# Patient Record
Sex: MALE | Race: BLACK OR AFRICAN AMERICAN | NOT HISPANIC OR LATINO | Employment: OTHER | URBAN - METROPOLITAN AREA
[De-identification: names, ages, dates, MRNs, and addresses within clinical notes are randomized per-mention and may not be internally consistent; named-entity substitution may affect disease eponyms.]

---

## 2017-01-18 DIAGNOSIS — M17.0 OSTEOARTHRITIS OF BOTH KNEES, UNSPECIFIED OSTEOARTHRITIS TYPE: Chronic | ICD-10-CM

## 2017-01-18 NOTE — TELEPHONE ENCOUNTER
----- Message from Sarah Asencio sent at 1/18/2017  2:25 PM CST -----  Contact: self  Patient needs a refill on Hydrocodone called into [facility] pharmacy at [phone].  Please call patient at [phone] if you have any questions. Thanks!     [facility] [address] [place] [address] & [address] [address]  Phone: [phone] Fax: [phone]

## 2017-01-19 RX ORDER — HYDROCODONE BITARTRATE AND ACETAMINOPHEN 7.5; 325 MG/1; MG/1
1 TABLET ORAL EVERY 6 HOURS PRN
Qty: 90 TABLET | Refills: 0 | Status: SHIPPED | OUTPATIENT
Start: 2017-01-19 | End: 2017-02-14 | Stop reason: SDUPTHER

## 2017-02-14 DIAGNOSIS — M17.0 OSTEOARTHRITIS OF BOTH KNEES, UNSPECIFIED OSTEOARTHRITIS TYPE: Chronic | ICD-10-CM

## 2017-02-17 DIAGNOSIS — M17.0 OSTEOARTHRITIS OF BOTH KNEES, UNSPECIFIED OSTEOARTHRITIS TYPE: Chronic | ICD-10-CM

## 2017-02-17 NOTE — TELEPHONE ENCOUNTER
----- Message from Gely Tavarez sent at 2/17/2017  4:39 PM CST -----  Refill on Rx Hydrocodone.  Please send into [facility]/[address]  Any questions call [phone].

## 2017-02-17 NOTE — TELEPHONE ENCOUNTER
Patient requesting refill of Hydrocodone.  LR--1-19-17  LOV-11-11-16  FOV-None Noted  Urine Bjucyuwjnx-7-18-16  Pain Zlavpjsd-2-8-14

## 2017-02-19 RX ORDER — HYDROCODONE BITARTRATE AND ACETAMINOPHEN 7.5; 325 MG/1; MG/1
1 TABLET ORAL EVERY 6 HOURS PRN
Qty: 90 TABLET | Refills: 0 | OUTPATIENT
Start: 2017-02-19

## 2017-02-19 RX ORDER — HYDROCODONE BITARTRATE AND ACETAMINOPHEN 7.5; 325 MG/1; MG/1
1 TABLET ORAL EVERY 6 HOURS PRN
Qty: 90 TABLET | Refills: 0 | Status: SHIPPED | OUTPATIENT
Start: 2017-02-19 | End: 2017-03-16 | Stop reason: SDUPTHER

## 2017-03-16 DIAGNOSIS — M17.0 OSTEOARTHRITIS OF BOTH KNEES, UNSPECIFIED OSTEOARTHRITIS TYPE: Chronic | ICD-10-CM

## 2017-03-16 NOTE — TELEPHONE ENCOUNTER
----- Message from Nathalie Dillard sent at 3/16/2017  3:06 PM CDT -----  Contact: pt   hydrocodone-acetaminophen 7.5-325mg (NORCO) 7.5-325 mg   .  [facility] [address] [place] [address] & [address] [address]  Phone: [phone] Fax: [phone]    Call back [phone]

## 2017-03-17 RX ORDER — HYDROCODONE BITARTRATE AND ACETAMINOPHEN 7.5; 325 MG/1; MG/1
1 TABLET ORAL EVERY 6 HOURS PRN
Qty: 90 TABLET | Refills: 0 | Status: SHIPPED | OUTPATIENT
Start: 2017-03-17 | End: 2017-04-21 | Stop reason: SDUPTHER

## 2017-04-17 DIAGNOSIS — M17.0 OSTEOARTHRITIS OF BOTH KNEES, UNSPECIFIED OSTEOARTHRITIS TYPE: Chronic | ICD-10-CM

## 2017-04-17 RX ORDER — HYDROCODONE BITARTRATE AND ACETAMINOPHEN 7.5; 325 MG/1; MG/1
1 TABLET ORAL EVERY 6 HOURS PRN
Qty: 90 TABLET | Refills: 0 | Status: CANCELLED | OUTPATIENT
Start: 2017-04-17

## 2017-04-17 NOTE — TELEPHONE ENCOUNTER
----- Message from Natalia Sheikh sent at 4/17/2017  7:59 AM CDT -----  Patient requested refill on Hydrocodone, call into [facility] pharmacy below.  Please call patient at  [phone] if you have any questions. Thank you.       [facility] [address] [place] [address] & [address] [address]  Phone: [phone] Fax: [phone]

## 2017-04-17 NOTE — TELEPHONE ENCOUNTER
Patient requesting a refill of Hydrocodone.  LR--3-17-17  LOV--11-11-16  FOV--None Noted  Urine Toxicology--8-22-16  Pain Contract--4-2-14

## 2017-04-19 DIAGNOSIS — M17.0 OSTEOARTHRITIS OF BOTH KNEES, UNSPECIFIED OSTEOARTHRITIS TYPE: Chronic | ICD-10-CM

## 2017-04-19 RX ORDER — HYDROCODONE BITARTRATE AND ACETAMINOPHEN 7.5; 325 MG/1; MG/1
1 TABLET ORAL EVERY 6 HOURS PRN
Qty: 90 TABLET | Refills: 0 | Status: CANCELLED | OUTPATIENT
Start: 2017-04-19

## 2017-04-19 NOTE — TELEPHONE ENCOUNTER
----- Message from Candace Cote sent at 4/19/2017  7:52 AM CDT -----  Contact: [phone]  Patient requesting a refill on hydrocodone.      Patient will be using   [facility] [address] [place] [address] & [address] [address]  Phone: [phone] Fax: [phone]    Please call patient at [phone]. Thanks!

## 2017-04-19 NOTE — TELEPHONE ENCOUNTER
Patient requesting a refill of Hydrocodone.  LR--3-17-17  LOV--11-11-17  FOV--NONE NOTED  Urine Toxicology--8-22-16  Pain Contract--5-12-14

## 2017-04-21 ENCOUNTER — TELEPHONE (OUTPATIENT)
Dept: FAMILY MEDICINE | Facility: CLINIC | Age: 57
End: 2017-04-21

## 2017-04-21 DIAGNOSIS — M17.0 OSTEOARTHRITIS OF BOTH KNEES, UNSPECIFIED OSTEOARTHRITIS TYPE: Chronic | ICD-10-CM

## 2017-04-21 RX ORDER — HYDROCODONE BITARTRATE AND ACETAMINOPHEN 7.5; 325 MG/1; MG/1
1 TABLET ORAL EVERY 6 HOURS PRN
Qty: 90 TABLET | Refills: 0 | Status: SHIPPED | OUTPATIENT
Start: 2017-04-21 | End: 2017-04-21 | Stop reason: SDUPTHER

## 2017-04-21 RX ORDER — HYDROCODONE BITARTRATE AND ACETAMINOPHEN 7.5; 325 MG/1; MG/1
1 TABLET ORAL EVERY 6 HOURS PRN
Qty: 90 TABLET | Refills: 0 | Status: SHIPPED | OUTPATIENT
Start: 2017-04-21 | End: 2017-05-19 | Stop reason: SDUPTHER

## 2017-04-21 NOTE — TELEPHONE ENCOUNTER
Patient notified prescription for Hydrocodone printed and available in office for . Verbalized understanding.

## 2017-04-21 NOTE — TELEPHONE ENCOUNTER
----- Message from Alison Dennison sent at 4/21/2017 12:03 PM CDT -----  Contact: patient  Patient calling in regards to following up on the refill request for Hydrocodone. He would like a call back when it is sent in. Please advise.  Call back .  Thanks!  [facility] [employer] [address] [place] [address] & [address] [address]  Phone: [phone] Fax: [phone]

## 2017-04-24 ENCOUNTER — TELEPHONE (OUTPATIENT)
Dept: FAMILY MEDICINE | Facility: CLINIC | Age: 57
End: 2017-04-24

## 2017-04-24 NOTE — TELEPHONE ENCOUNTER
----- Message from Gwen Carroll sent at 4/22/2017 11:53 AM CDT -----  Contact: Patient came to lab desk this Saturday  Patient stated he was contacted last night at 6PM to come  his prescription. I went to the prescription box to check and was unable to locate one for him. I checked both the letter before and after the K's to ensure it was not misfiled.    Patient states he will call Monday 4/24/17

## 2017-05-19 DIAGNOSIS — M17.0 OSTEOARTHRITIS OF BOTH KNEES, UNSPECIFIED OSTEOARTHRITIS TYPE: Chronic | ICD-10-CM

## 2017-05-19 NOTE — TELEPHONE ENCOUNTER
----- Message from Nathalie Dillard sent at 5/19/2017  3:19 PM CDT -----  Contact: pt   hydrocodone-acetaminophen 7.5-325mg (NORCO) 7.5-325 mg   Wants refilled today   Call back  [phone]

## 2017-05-19 NOTE — TELEPHONE ENCOUNTER
Patient requesting a refill of Hydrocodone. Last office visit noted on 11-11-16. Spoke to patient, advised him office visit required every 3-4 months related to narcotic refill. Appointment scheduled for 6-1-17. Patient agreed to appointment date and time.  LR--4-21-17  LOV--11-11-16  FOV--6-1-17  Urine Toxicology--8-22-16  Pain Contract--4-2-14

## 2017-05-20 RX ORDER — HYDROCODONE BITARTRATE AND ACETAMINOPHEN 7.5; 325 MG/1; MG/1
1 TABLET ORAL EVERY 6 HOURS PRN
Qty: 45 TABLET | Refills: 0 | Status: SHIPPED | OUTPATIENT
Start: 2017-05-20 | End: 2017-06-01 | Stop reason: SDUPTHER

## 2017-06-01 ENCOUNTER — OFFICE VISIT (OUTPATIENT)
Dept: FAMILY MEDICINE | Facility: CLINIC | Age: 57
End: 2017-06-01
Payer: COMMERCIAL

## 2017-06-01 VITALS
BODY MASS INDEX: 29.97 KG/M2 | HEIGHT: 71 IN | HEART RATE: 74 BPM | SYSTOLIC BLOOD PRESSURE: 133 MMHG | DIASTOLIC BLOOD PRESSURE: 86 MMHG | WEIGHT: 214.06 LBS | TEMPERATURE: 98 F

## 2017-06-01 DIAGNOSIS — S83.512D RUPTURE OF ANTERIOR CRUCIATE LIGAMENT OF BOTH KNEES, SUBSEQUENT ENCOUNTER: Primary | ICD-10-CM

## 2017-06-01 DIAGNOSIS — S83.511D RUPTURE OF ANTERIOR CRUCIATE LIGAMENT OF BOTH KNEES, SUBSEQUENT ENCOUNTER: Primary | ICD-10-CM

## 2017-06-01 DIAGNOSIS — M17.0 OSTEOARTHRITIS OF BOTH KNEES, UNSPECIFIED OSTEOARTHRITIS TYPE: Chronic | ICD-10-CM

## 2017-06-01 DIAGNOSIS — J30.1 SEASONAL ALLERGIC RHINITIS DUE TO POLLEN: ICD-10-CM

## 2017-06-01 DIAGNOSIS — I10 ESSENTIAL HYPERTENSION: Chronic | ICD-10-CM

## 2017-06-01 DIAGNOSIS — M17.9 OSTEOARTHRITIS OF KNEE, UNSPECIFIED LATERALITY, UNSPECIFIED OSTEOARTHRITIS TYPE: ICD-10-CM

## 2017-06-01 LAB
AMPHET+METHAMPHET UR QL: NEGATIVE
BARBITURATES UR QL SCN>200 NG/ML: NEGATIVE
BENZODIAZ UR QL SCN>200 NG/ML: NEGATIVE
BZE UR QL SCN: NEGATIVE
CANNABINOIDS UR QL SCN: NEGATIVE
CREAT UR-MCNC: 217 MG/DL
ETHANOL UR-MCNC: <10 MG/DL
METHADONE UR QL SCN>300 NG/ML: NEGATIVE
OPIATES UR QL SCN: NORMAL
PCP UR QL SCN>25 NG/ML: NEGATIVE
TOXICOLOGY INFORMATION: NORMAL

## 2017-06-01 PROCEDURE — 80307 DRUG TEST PRSMV CHEM ANLYZR: CPT

## 2017-06-01 PROCEDURE — 99214 OFFICE O/P EST MOD 30 MIN: CPT | Mod: S$GLB,,, | Performed by: NURSE PRACTITIONER

## 2017-06-01 PROCEDURE — 99999 PR PBB SHADOW E&M-EST. PATIENT-LVL III: CPT | Mod: PBBFAC,,, | Performed by: NURSE PRACTITIONER

## 2017-06-01 RX ORDER — FLUTICASONE PROPIONATE 50 MCG
SPRAY, SUSPENSION (ML) NASAL
Qty: 1 BOTTLE | Refills: 11 | Status: SHIPPED | OUTPATIENT
Start: 2017-06-01 | End: 2017-12-01 | Stop reason: SDUPTHER

## 2017-06-01 RX ORDER — HYDROCODONE BITARTRATE AND ACETAMINOPHEN 7.5; 325 MG/1; MG/1
1 TABLET ORAL EVERY 6 HOURS PRN
Qty: 90 TABLET | Refills: 0 | Status: SHIPPED | OUTPATIENT
Start: 2017-06-01 | End: 2017-06-28 | Stop reason: SDUPTHER

## 2017-06-01 NOTE — PATIENT INSTRUCTIONS

## 2017-06-01 NOTE — PROGRESS NOTES
Subjective:       Patient ID: Shamar Anne is a 57 y.o. male.    Chief Complaint: Hypertension    Mr. Anne presents to the clinic today for medication refill of [place] which he takes for bilateral knee pain.  He was referred at last visit to Dr. Davis, Pain Management, and he did not make an appointment.  He states he has a friend who received injections to her back and they do not help her.  He does not want knee injections.  He saw Dr. Payne in 2013 who advised knee replacement for bilateral chronic ACL tears.  He does not currently have anyone to care for him after surgery but he plans on eventually having the surgery.  He has a history of hypertension which is controlled today.  He complains of chronic rhinitis.  States this is seasonal although he had allergy testing in the past which was negative.  History of sinus surgery.  Takes Zyrtec daily, no nasal sprays.  Used neti pot in the past with good relief.  Lab work was ordered at last visit but he has not yet had this done.       Hypertension   This is a chronic problem. The current episode started more than 1 month ago. The problem is controlled. Pertinent negatives include no chest pain or shortness of breath. There are no associated agents to hypertension. Risk factors for coronary artery disease include male gender and dyslipidemia. Past treatments include calcium channel blockers. The current treatment provides significant improvement. There is no history of angina, kidney disease, CAD/MI or CVA.     Review of Systems   Constitutional: Negative for chills and fever.   HENT: Positive for congestion. Negative for ear pain and sinus pressure.    Respiratory: Negative for cough, shortness of breath and wheezing.    Cardiovascular: Negative for chest pain.   Musculoskeletal: Positive for arthralgias (bilateral knee). Negative for myalgias.       Objective:      Physical Exam   Constitutional: He is oriented to person, place, and time. He appears  well-developed and well-nourished. No distress.   HENT:   Head: Normocephalic and atraumatic.   Right Ear: External ear normal.   Left Ear: External ear normal.   Mouth/Throat: Oropharynx is clear and moist. No oropharyngeal exudate.   Eyes: Pupils are equal, round, and reactive to light. Right eye exhibits no discharge. Left eye exhibits no discharge.   Neck: Neck supple. No thyromegaly present.   Cardiovascular: Normal rate and regular rhythm.  Exam reveals no gallop and no friction rub.    No murmur heard.  Pulmonary/Chest: Effort normal and breath sounds normal. No respiratory distress. He has no wheezes. He has no rales.   Musculoskeletal: Normal range of motion. He exhibits no edema.        Right knee: He exhibits normal range of motion, no effusion and no erythema. Tenderness found. Lateral joint line tenderness noted.        Left knee: He exhibits normal range of motion, no swelling, no erythema and no bony tenderness. Tenderness found. Lateral joint line tenderness noted. No patellar tendon tenderness noted.   Lymphadenopathy:     He has no cervical adenopathy.   Neurological: He is alert and oriented to person, place, and time. Coordination normal.   Skin: Skin is warm and dry.   Psychiatric: He has a normal mood and affect. His behavior is normal. Thought content normal.   Vitals reviewed.          Current Outpatient Prescriptions:     amlodipine (NORVASC) 10 MG tablet, Take 1 tablet (10 mg total) by mouth once daily., Disp: 90 tablet, Rfl: 3    cetirizine (ZYRTEC) 10 MG tablet, , Disp: , Rfl:     diclofenac (VOLTAREN) 75 MG EC tablet, TAKE ONE TABLET BY MOUTH TWO TIMES A DAY AS NEEDED, Disp: 45 tablet, Rfl: 1    fenofibrate 160 MG Tab, Take 1 tablet (160 mg total) by mouth once daily., Disp: 90 tablet, Rfl: 3    fluticasone (FLONASE) 50 mcg/actuation nasal spray, Use two sprays in each nostril once daily for one week, then one spray in each nostril daily., Disp: 1 Bottle, Rfl: 11     hydrocodone-acetaminophen 7.5-325mg (NORCO) 7.5-325 mg per tablet, Take 1 tablet by mouth every 6 (six) hours as needed., Disp: 45 tablet, Rfl: 0    pantoprazole (PROTONIX) 40 MG tablet, Take 1 tablet (40 mg total) by mouth once daily., Disp: 90 tablet, Rfl: 1    VIAGRA 100 mg tablet, , Disp: , Rfl:   Assessment:       1. Rupture of anterior cruciate ligament of both knees, subsequent encounter    2. Seasonal allergic rhinitis due to pollen    3. Osteoarthritis of knee, unspecified laterality, unspecified osteoarthritis type    4. Essential hypertension        Plan:     Rupture of anterior cruciate ligament of both knees, subsequent encounter  Patient plans to schedule knee surgery in the future.  Sent refill request for [place] to Dr. Magana.    RTC 3 mos.    Seasonal allergic rhinitis due to pollen  Saline nasal spray.  -     fluticasone (FLONASE) 50 mcg/actuation nasal spray; Use two sprays in each nostril once daily for one week, then one spray in each nostril daily.  Dispense: 1 Bottle; Refill: 11    Osteoarthritis of knee, unspecified laterality, unspecified osteoarthritis type  -     TOXICOLOGY SCREEN, URINE, RANDOM (COMPLIANCE)    Essential hypertension  Stable on current medication.  Patient readiness: acceptance and barriers:none    During the course of the visit the patient was educated and counseled about the following:     Hypertension:   Medication: no change.    Goals: Hypertension: Reduce Blood Pressure    Did patient meet goals/outcomes: Yes    The following self management tools provided: declined    Patient Instructions (the written plan) was given to the patient/family.     Time spent with patient: 15 minutes

## 2017-06-01 NOTE — TELEPHONE ENCOUNTER
Patient seen today for medication refill.  Urine tox done today.  Last prescribed 5/20 but only for 45 tabs q6h prn.  He does not want to see Dr. Davis.

## 2017-06-28 DIAGNOSIS — M17.0 OSTEOARTHRITIS OF BOTH KNEES, UNSPECIFIED OSTEOARTHRITIS TYPE: Chronic | ICD-10-CM

## 2017-07-03 DIAGNOSIS — M17.0 OSTEOARTHRITIS OF BOTH KNEES, UNSPECIFIED OSTEOARTHRITIS TYPE: Chronic | ICD-10-CM

## 2017-07-03 RX ORDER — DICLOFENAC SODIUM 75 MG/1
TABLET, DELAYED RELEASE ORAL
Qty: 45 TABLET | Refills: 2 | Status: SHIPPED | OUTPATIENT
Start: 2017-07-03 | End: 2018-02-26

## 2017-07-04 RX ORDER — HYDROCODONE BITARTRATE AND ACETAMINOPHEN 7.5; 325 MG/1; MG/1
1 TABLET ORAL EVERY 6 HOURS PRN
Qty: 90 TABLET | Refills: 0 | Status: SHIPPED | OUTPATIENT
Start: 2017-07-04 | End: 2017-07-28 | Stop reason: SDUPTHER

## 2017-07-28 DIAGNOSIS — M17.0 OSTEOARTHRITIS OF BOTH KNEES, UNSPECIFIED OSTEOARTHRITIS TYPE: Chronic | ICD-10-CM

## 2017-07-28 NOTE — TELEPHONE ENCOUNTER
----- Message from Candace Cote sent at 7/28/2017  9:07 AM CDT -----  Contact: [phone]  Patient requesting a refill on hydrocodone.      Patient will be using   [facility] [address] [place] [address] & [address] [address]  Phone: [phone] Fax: [phone]    Please call patient at [phone]. Thanks!

## 2017-07-28 NOTE — TELEPHONE ENCOUNTER
Patient requesting a refill of Hydrocodone.  LR--7-4-17  LOV--6-1-17  FOV--9-5-17  Urine Toxicology--6-1-17  Pain Contract--5-12-14

## 2017-07-29 RX ORDER — HYDROCODONE BITARTRATE AND ACETAMINOPHEN 7.5; 325 MG/1; MG/1
1 TABLET ORAL EVERY 6 HOURS PRN
Qty: 90 TABLET | Refills: 0 | Status: SHIPPED | OUTPATIENT
Start: 2017-07-29 | End: 2017-09-05 | Stop reason: SDUPTHER

## 2017-07-31 ENCOUNTER — TELEPHONE (OUTPATIENT)
Dept: FAMILY MEDICINE | Facility: CLINIC | Age: 57
End: 2017-07-31

## 2017-07-31 NOTE — TELEPHONE ENCOUNTER
Prescription for Hydrocodone printed by Dr. Magana due to e-scribe error.  Prescription available for  at office. Patient notified, states will  prescription today. Prescription placed at  for patient to  at his convenience.

## 2017-07-31 NOTE — TELEPHONE ENCOUNTER
----- Message from Jeffery Bone sent at 7/31/2017  9:09 AM CDT -----  Contact: Patient  Patient states that he needs a refill for the hydrocodone-acetaminophen 7.5-325mg (NORCO) 7.5-325 mg per tablets and for the fluticasone (FLONASE) 50 mcg/actuation nasal spray.  It looks like the pharmacy did not get the request and to please re-send. Can you please look in to this matter and call the patient back at [phone].  Thank you      [facility] [address] [place] [address] & [address] [address]  Phone: [phone] Fax: [phone]

## 2017-08-24 DIAGNOSIS — Z12.11 COLON CANCER SCREENING: ICD-10-CM

## 2017-09-01 ENCOUNTER — DOCUMENTATION ONLY (OUTPATIENT)
Dept: FAMILY MEDICINE | Facility: CLINIC | Age: 57
End: 2017-09-01

## 2017-09-01 NOTE — PROGRESS NOTES
Pre-Visit Chart Review  For Appointment Scheduled on 09/05/2017    Health Maintenance Due   Topic Date Due    TETANUS VACCINE  02/05/1979    Colonoscopy  02/05/2011    Influenza Vaccine  08/01/2017

## 2017-09-05 ENCOUNTER — OFFICE VISIT (OUTPATIENT)
Dept: FAMILY MEDICINE | Facility: CLINIC | Age: 57
End: 2017-09-05
Payer: COMMERCIAL

## 2017-09-05 VITALS
BODY MASS INDEX: 29.94 KG/M2 | HEART RATE: 59 BPM | HEIGHT: 71 IN | TEMPERATURE: 98 F | WEIGHT: 213.88 LBS | SYSTOLIC BLOOD PRESSURE: 128 MMHG | DIASTOLIC BLOOD PRESSURE: 83 MMHG

## 2017-09-05 DIAGNOSIS — E78.2 MIXED HYPERLIPIDEMIA: Primary | ICD-10-CM

## 2017-09-05 DIAGNOSIS — M17.0 OSTEOARTHRITIS OF BOTH KNEES, UNSPECIFIED OSTEOARTHRITIS TYPE: Chronic | ICD-10-CM

## 2017-09-05 DIAGNOSIS — Z12.5 PROSTATE CANCER SCREENING: ICD-10-CM

## 2017-09-05 PROCEDURE — 3079F DIAST BP 80-89 MM HG: CPT | Mod: S$GLB,,, | Performed by: FAMILY MEDICINE

## 2017-09-05 PROCEDURE — 3008F BODY MASS INDEX DOCD: CPT | Mod: S$GLB,,, | Performed by: FAMILY MEDICINE

## 2017-09-05 PROCEDURE — 99999 PR PBB SHADOW E&M-EST. PATIENT-LVL IV: CPT | Mod: PBBFAC,,, | Performed by: FAMILY MEDICINE

## 2017-09-05 PROCEDURE — 3074F SYST BP LT 130 MM HG: CPT | Mod: S$GLB,,, | Performed by: FAMILY MEDICINE

## 2017-09-05 PROCEDURE — 99214 OFFICE O/P EST MOD 30 MIN: CPT | Mod: S$GLB,,, | Performed by: FAMILY MEDICINE

## 2017-09-05 RX ORDER — HYDROCODONE BITARTRATE AND ACETAMINOPHEN 7.5; 325 MG/1; MG/1
1 TABLET ORAL EVERY 8 HOURS PRN
Qty: 90 TABLET | Refills: 0 | Status: SHIPPED | OUTPATIENT
Start: 2017-11-05 | End: 2017-12-01 | Stop reason: SDUPTHER

## 2017-09-05 RX ORDER — HYDROCODONE BITARTRATE AND ACETAMINOPHEN 7.5; 325 MG/1; MG/1
1 TABLET ORAL EVERY 8 HOURS PRN
Qty: 90 TABLET | Refills: 0 | Status: SHIPPED | OUTPATIENT
Start: 2017-09-05 | End: 2017-12-01 | Stop reason: SDUPTHER

## 2017-09-05 RX ORDER — HYDROCODONE BITARTRATE AND ACETAMINOPHEN 7.5; 325 MG/1; MG/1
1 TABLET ORAL EVERY 8 HOURS PRN
Qty: 90 TABLET | Refills: 0 | Status: SHIPPED | OUTPATIENT
Start: 2017-10-05 | End: 2017-12-01 | Stop reason: SDUPTHER

## 2017-09-05 RX ORDER — BROMPHENIRAMINE MALEATE, DEXTROMETHORPHAN HBR, PHENYLEPHRINE HCL, DIPHENHYDRAMINE HCL, PHENYLEPHRINE HCL 0.52G
4 KIT ORAL DAILY
Qty: 120 CAPSULE | Refills: 11 | Status: SHIPPED | OUTPATIENT
Start: 2017-09-05 | End: 2022-02-14

## 2017-09-05 NOTE — PATIENT INSTRUCTIONS
Arthritis: Exercise     Look for exercise classes for arthritis in your community.     Exercise is important to your overall health. It is especially important in people with arthritis. Regular exercise can:  · Keep your heart and blood vessels healthy  · Help with weight management, or weight loss  · Improve your mood  · Help prevent and manage health problems such as:  ¨ Diabetes  ¨ High blood pressure  ¨ High cholesterol  ¨ Depression  In people with arthritis, it offers all of those benefits and it can:  · Lessen pain and stiffness  · Strengthen muscles that support your joints  · Help you to be able to do the things you enjoy  Exercise and arthritis  Exercise is an important part of any arthritis treatment plan. A complete program consists of the following three types of exercises:  · Aerobic exercises for cardiovascular health and overall fitness.   · Strengthening exercises to build up muscles to help prevent injury and keep joints stable.  · Range-of-motion exercises to keep muscles and joints flexible.  Getting started  Talk with your healthcare provider about what is safe for you. Make sure you:  · Learn how to do exercises properly and safely. Consider talking with a physical therapist or  used to working with people with arthritis.  · Start gradually and build. If you haven't been exercising, start slowly. Don't exercise too hard or too long.  · Create a routine. Set aside specific times for exercise every day.  · Warm up carefully. Take 5 to 10 minutes at the beginning and end of exercising to warm up and cool down. Just do the same exercises at a slower pace for 5 to 10 minutes.  · Work at a comfortable, smooth pace. Move your joints gently to prevent injury.  · Pay attention to your body. Don't exercise a painful or swollen joint; switch to another activity. Follow the 2-hour pain rule: You did too much if your joint or muscle pain lasts 2 hours or more after exercising, or is worse the next  day. This doesn't mean you should stop exercising. Just do less.  Aerobic exercise  Aerobic exercise improves overall health and helps control weight. Choose those that don't add extra stress to your joints. For example, walking, swimming, or bicycling.  Most people should exercise for at least 30 minutes. most days of the week. You don't have to exercise all at once. Try exercising for 10 minutes, 3 times a day, for example.  Strengthening exercises  Strengthening your muscles help to protect your joints and prevent injuries. Try to do strengthening exercises 2 to 3 times a week:  · These exercises can be done with exercise or resistance bands (inexpensive exercise aids that add resistance), or with light weights. Some people use soup cans as weights.  · Isometric exercises are done by tightening the muscles without moving the joint. This may be a good way to strengthen the muscles around a stiff joint.  A physical therapist or  can teach you how to do these exercises.  Range-of-motion (ROM) exercises  Range-of-motion (ROM) exercises allow you to move each of your joints in every way they are intended to move. You should do ROM exercises for each joint 2 to 3 times a day. This will help you maintain full use of all of your joints.  Sample ROM exercises  The following are just a sample of ROM exercises--one for your neck, shoulders, elbows, hips, knees, and ankles. To completely move each joint through its full range of motion, you will have to do a few exercises for each joint. A physical therapist or  can teach you how to do full ROM exercises for each joint.   Repeat each for these exercises 5 to 10 times. Make sure you move slowly:  1. Neck turns. Sit in a straight-backed chair. Look straight ahead. Slowly turn your head to the right, then return it to center. Repeat. Do the same thing, turning your head to the left. Repeat.  2. Shoulder raise. Lie on your back or sit in a chair. Raise one arm over  your head, keeping your elbow straight. Keep the arm close to your ear. Return it slowly to your side. Repeat with your other arm.  3. Elbow stretch. Sit in a chair. If you are able, put both arms out to your sides to form a T. Slowly touch your shoulders with the tips of your fingers. Then return to the T-position. Repeat.  4. Hip stretch. Lie on your back with your legs straight and about 6 inches apart. With your foot flexed, slide your leg out to the side, then slide it back to the starting position. Repeat with your other leg.  5. Knee bend. Sit in a chair with your legs bent at the knees in front of you. Straighten one leg as much as you can, then bring it back to the floor. Repeat this 5 to 10 times. Then do the same thing with the other leg.   6. Ankle stretch. Sit with your feet flat on the floor. Lift your toes off of the floor while your heels stay down. Repeat. Then lift your heels off the floor while your toes stay down. Repeat.  Other exercise  Many other exercise and activities benefit people with arthritis. It is most important to find exercise and activities that you enjoy. You might try:  · Yoga, including chair yoga, helps to keep your joints strong and flexible.   · Sherwin Chi, an ancient type of exercise with slow, gentle movements  · Water exercise, including water walking  For more information on exercise for arthritis go to the Arth[employer] website: www.arthritis.org.  Date Last Reviewed: 2/14/2016  © 4510-1201 [employer]. [address] [place] [address], [place] [address]. All rights reserved. This information is not intended as a substitute for professional medical care. Always follow your healthcare professional's instructions.        Facts About Dietary Fat     Olive oil is a good source of unsaturated fat.     Eating less saturated and trans fat is one of the best things you can do for your heart. Start by finding out which fats are better to use. Then always try to use as  "little "bad" fat as you can.  Why eat less fat?  · Cutting down on the fat you eat can lower your blood cholesterol levels. This may help prevent clogged arteries from buildup of plaque.  · A low-fat diet can help you lose excess weight. Doing so can lower your blood pressure and reduce your chances of getting diabetes.  · A low-fat diet reduces your risk for stroke and for some cancers.  Unsaturated fat is most healthy  · When you must add fat, use unsaturated fat.  · Unsaturated fats come from plants. They include olive, canola, peanut, corn, avocado, safflower, and sunflower oils.  · Liquid (squeezable) margarine is also mostly unsaturated fat.  · In moderate amounts, unsaturated fat can even be good for your heart.  Saturated fat is less healthy  · Avoid eating saturated fat because it raises your blood cholesterol levels.  · Most saturated fat comes from animals. Foods such as butter, lard, cheese, cream, whole milk, and fatty cuts of meat are high in saturated fat.  · Some oils, such as palm and coconut oils, are also saturated fats.  Trans fat is least healthy  · Also avoid trans fat whenever possible. Even if it's not listed on the food label, look for it in the ingredients in the form of hydrogenated or partially hydrogenated oils.  · This is found in snack foods, shortening, french fries, and stick margarines.  Add flavor without fat  · Sprinkle herbs on fish, chicken, and meat, and in soups.  · Try herbs, lemon juice, or flavored vinegar on vegetables.  · Add chopped onions, garlic, and peppers to flavor beans and rice.   Date Last Reviewed: 5/11/2015  © 1794-2488 [employer]. [address] [place] [address], [place] [address]. All rights reserved. This information is not intended as a substitute for professional medical care. Always follow your healthcare professional's instructions.        "

## 2017-09-05 NOTE — PROGRESS NOTES
The 10-year ASCVD risk score (Daren NÚÑEZ Jr., et al., 2013) is: 12.6%    Values used to calculate the score:      Age: 57 years      Sex: Male      Is Non- : Yes      Diabetic: No      Tobacco smoker: No      Systolic Blood Pressure: 128 mmHg      Is BP treated: Yes      HDL Cholesterol: 33 mg/dL      Total Cholesterol: 187 mg/dL

## 2017-09-17 NOTE — PROGRESS NOTES
Subjective:       Patient ID: Shamar Anne is a 57 y.o. male.    Chief Complaint: Hypertension    Hypertension   This is a chronic problem. The current episode started more than 1 year ago. The problem has been resolved since onset. The problem is controlled. Associated symptoms include malaise/fatigue and neck pain. Pertinent negatives include no anxiety, blurred vision, chest pain, headaches, palpitations, peripheral edema, PND, shortness of breath or sweats. Agents associated with hypertension include NSAIDs. Risk factors for coronary artery disease include male gender, obesity and sedentary lifestyle.     Review of Systems   Constitutional: Positive for malaise/fatigue. Negative for fatigue and unexpected weight change.   Eyes: Negative for blurred vision.   Respiratory: Negative for chest tightness and shortness of breath.    Cardiovascular: Negative for chest pain, palpitations, leg swelling and PND.   Gastrointestinal: Negative for abdominal pain.   Musculoskeletal: Positive for arthralgias, back pain and neck pain.   Neurological: Negative for dizziness, syncope, light-headedness and headaches.       Patient Active Problem List   Diagnosis    DJD (degenerative joint disease) of knee    ACL (anterior cruciate ligament) rupture    HTN (hypertension)    OA (osteoarthritis) of knee    Dyslipidemia    Obesity (BMI 30-39.9)       Objective:      Physical Exam   Constitutional: He is oriented to person, place, and time. He appears well-developed and well-nourished.   Cardiovascular: Normal rate, regular rhythm and normal heart sounds.    Pulmonary/Chest: Effort normal and breath sounds normal.   Musculoskeletal: He exhibits no edema.        Lumbar back: He exhibits decreased range of motion, tenderness, bony tenderness, deformity and pain.   Neurological: He is alert and oriented to person, place, and time.   Skin: Skin is warm and dry.   Psychiatric: He has a normal mood and affect.   Nursing note and  vitals reviewed.      Lab Results   Component Value Date    WBC 6.56 08/01/2014    HGB 16.6 08/01/2014    HCT 49.1 08/01/2014     08/01/2014    CHOL 187 08/15/2016    TRIG 155 (H) 08/15/2016    HDL 33 (L) 08/15/2016    ALT 13 08/15/2016    AST 17 08/15/2016     08/15/2016    K 4.1 08/15/2016     08/15/2016    CREATININE 1.1 08/15/2016    BUN 17 08/15/2016    CO2 24 08/15/2016    TSH 0.927 08/01/2014     The 10-year ASCVD risk score (Daren NÚÑEZ Jr., et al., 2013) is: 12.6%    Values used to calculate the score:      Age: 57 years      Sex: Male      Is Non- : Yes      Diabetic: No      Tobacco smoker: No      Systolic Blood Pressure: 128 mmHg      Is BP treated: Yes      HDL Cholesterol: 33 mg/dL      Total Cholesterol: 187 mg/dL    Assessment:       1. Mixed hyperlipidemia    2. Prostate cancer screening    3. Osteoarthritis of both knees, unspecified osteoarthritis type        Plan:       Mixed hyperlipidemia  -     Lipid panel; Future; Expected date: 09/05/2017  -     Comprehensive metabolic panel; Future; Expected date: 09/05/2017  -     CBC auto differential; Future; Expected date: 09/05/2017  -     psyllium (METAMUCIL) 0.52 gram capsule; Take 4 capsules (2.08 g total) by mouth once daily.  Dispense: 120 capsule; Refill: 11    Prostate cancer screening  -     PROSTATE SPECIFIC ANTIGEN, DIAGNOSTIC; Future; Expected date: 09/05/2017    Osteoarthritis of both knees, unspecified osteoarthritis type  -     hydrocodone-acetaminophen 7.5-325mg (NORCO) 7.5-325 mg per tablet; Take 1 tablet by mouth every 8 (eight) hours as needed.  Dispense: 90 tablet; Refill: 0  -     hydrocodone-acetaminophen 7.5-325mg (NORCO) 7.5-325 mg per tablet; Take 1 tablet by mouth every 8 (eight) hours as needed.  Dispense: 90 tablet; Refill: 0  -     hydrocodone-acetaminophen 7.5-325mg (NORCO) 7.5-325 mg per tablet; Take 1 tablet by mouth every 8 (eight) hours as needed.  Dispense: 90 tablet; Refill:  0    Other orders  -     DIPH,PERTUSS,ACEL,,TET VAC,PF,, ADULT, (ADACEL) 2 Lf-(2.5-5-3-5 mcg)-5Lf/0.5 mL injection; Inject 0.5 mLs into the muscle once.  Dispense: 0.5 mL; Refill: 0      Patient readiness: acceptance and barriers:readiness    During the course of the visit the patient was educated and counseled about the following:     Hypertension:   Dietary sodium restriction.  Regular aerobic exercise.  Check blood pressures daily and record.  Obesity:   General weight loss/lifestyle modification strategies discussed (elicit support from others; identify saboteurs; non-food rewards, etc).    Goals: Hypertension: Reduce Blood Pressure and Obesity: Reduce calorie intake and BMI    Did patient meet goals/outcomes: Yes    The following self management tools provided: blood pressure log  excercise log    Patient Instructions (the written plan) was given to the patient/family.     Time spent with patient: 30 minutes

## 2017-11-10 ENCOUNTER — TELEPHONE (OUTPATIENT)
Dept: FAMILY MEDICINE | Facility: CLINIC | Age: 57
End: 2017-11-10

## 2017-11-10 NOTE — TELEPHONE ENCOUNTER
----- Message from Natalia Sheihk sent at 11/10/2017  9:57 AM CST -----  Contact: patient  Patient requested refill on  hydrocodone-acetaminophen 7.5-325mg (NORCO) 7.5-325 mg per tablet, call into [facility] pharmacy at  [phone] Please call patient at  [phone]# if you have any questions. Thank you.         [facility] [address] [place] [address] & [address] [address]  Phone: [phone] Fax: [phone]

## 2017-11-10 NOTE — TELEPHONE ENCOUNTER
Patient requesting refill of Hydrocodone. Upon further inspection it was noted this medication was e-scribed on 9-5-17 for the months of September, October, and December. Patient notified. Verbalized understanding. Also advised patient appointment required every 3 months related to this medication, need appointment in December prior to refill. Patient request office schedule appointment and mail appointment slip to his home. States he was at work and unable to stay on the phone. Appointment scheduled of 12-1-17.  Appointment reminder mailed as requested.

## 2017-11-20 ENCOUNTER — LAB VISIT (OUTPATIENT)
Dept: LAB | Facility: HOSPITAL | Age: 57
End: 2017-11-20
Attending: FAMILY MEDICINE
Payer: COMMERCIAL

## 2017-11-20 DIAGNOSIS — E78.2 MIXED HYPERLIPIDEMIA: ICD-10-CM

## 2017-11-20 DIAGNOSIS — Z12.5 PROSTATE CANCER SCREENING: ICD-10-CM

## 2017-11-20 LAB
ALBUMIN SERPL BCP-MCNC: 3.7 G/DL
ALP SERPL-CCNC: 80 U/L
ALT SERPL W/O P-5'-P-CCNC: 28 U/L
ANION GAP SERPL CALC-SCNC: 8 MMOL/L
AST SERPL-CCNC: 45 U/L
BASOPHILS # BLD AUTO: 0.05 K/UL
BASOPHILS NFR BLD: 0.8 %
BILIRUB SERPL-MCNC: 0.7 MG/DL
BUN SERPL-MCNC: 11 MG/DL
CALCIUM SERPL-MCNC: 9.4 MG/DL
CHLORIDE SERPL-SCNC: 106 MMOL/L
CHOLEST SERPL-MCNC: 126 MG/DL
CHOLEST/HDLC SERPL: 6 {RATIO}
CO2 SERPL-SCNC: 27 MMOL/L
COMPLEXED PSA SERPL-MCNC: 2.1 NG/ML
CREAT SERPL-MCNC: 0.9 MG/DL
DIFFERENTIAL METHOD: ABNORMAL
EOSINOPHIL # BLD AUTO: 0.1 K/UL
EOSINOPHIL NFR BLD: 0.8 %
ERYTHROCYTE [DISTWIDTH] IN BLOOD BY AUTOMATED COUNT: 13.3 %
EST. GFR  (AFRICAN AMERICAN): >60 ML/MIN/1.73 M^2
EST. GFR  (NON AFRICAN AMERICAN): >60 ML/MIN/1.73 M^2
GLUCOSE SERPL-MCNC: 107 MG/DL
HCT VFR BLD AUTO: 45.4 %
HDLC SERPL-MCNC: 21 MG/DL
HDLC SERPL: 16.7 %
HGB BLD-MCNC: 15.4 G/DL
IMM GRANULOCYTES # BLD AUTO: 0.01 K/UL
IMM GRANULOCYTES NFR BLD AUTO: 0.2 %
LDLC SERPL CALC-MCNC: 43.4 MG/DL
LYMPHOCYTES # BLD AUTO: 3.6 K/UL
LYMPHOCYTES NFR BLD: 59.8 %
MCH RBC QN AUTO: 27.3 PG
MCHC RBC AUTO-ENTMCNC: 33.9 G/DL
MCV RBC AUTO: 80 FL
MONOCYTES # BLD AUTO: 0.6 K/UL
MONOCYTES NFR BLD: 10.5 %
NEUTROPHILS # BLD AUTO: 1.7 K/UL
NEUTROPHILS NFR BLD: 27.9 %
NONHDLC SERPL-MCNC: 105 MG/DL
NRBC BLD-RTO: 0 /100 WBC
PLATELET # BLD AUTO: 231 K/UL
PMV BLD AUTO: 10.6 FL
POTASSIUM SERPL-SCNC: 3.9 MMOL/L
PROT SERPL-MCNC: 7.5 G/DL
RBC # BLD AUTO: 5.65 M/UL
SODIUM SERPL-SCNC: 141 MMOL/L
TRIGL SERPL-MCNC: 308 MG/DL
WBC # BLD AUTO: 6 K/UL

## 2017-11-20 PROCEDURE — 36415 COLL VENOUS BLD VENIPUNCTURE: CPT | Mod: PO

## 2017-11-20 PROCEDURE — 80053 COMPREHEN METABOLIC PANEL: CPT

## 2017-11-20 PROCEDURE — 85025 COMPLETE CBC W/AUTO DIFF WBC: CPT

## 2017-11-20 PROCEDURE — 84153 ASSAY OF PSA TOTAL: CPT

## 2017-11-20 PROCEDURE — 80061 LIPID PANEL: CPT

## 2017-12-01 ENCOUNTER — OFFICE VISIT (OUTPATIENT)
Dept: FAMILY MEDICINE | Facility: CLINIC | Age: 57
End: 2017-12-01
Payer: COMMERCIAL

## 2017-12-01 VITALS
HEART RATE: 68 BPM | SYSTOLIC BLOOD PRESSURE: 130 MMHG | BODY MASS INDEX: 29.94 KG/M2 | HEIGHT: 71 IN | DIASTOLIC BLOOD PRESSURE: 84 MMHG | TEMPERATURE: 98 F | WEIGHT: 213.88 LBS

## 2017-12-01 DIAGNOSIS — I10 ESSENTIAL HYPERTENSION: Primary | Chronic | ICD-10-CM

## 2017-12-01 DIAGNOSIS — E78.5 DYSLIPIDEMIA: Chronic | ICD-10-CM

## 2017-12-01 DIAGNOSIS — M17.0 OSTEOARTHRITIS OF BOTH KNEES, UNSPECIFIED OSTEOARTHRITIS TYPE: Chronic | ICD-10-CM

## 2017-12-01 DIAGNOSIS — S83.512D RUPTURE OF ANTERIOR CRUCIATE LIGAMENT OF BOTH KNEES, SUBSEQUENT ENCOUNTER: ICD-10-CM

## 2017-12-01 DIAGNOSIS — J30.1 CHRONIC SEASONAL ALLERGIC RHINITIS DUE TO POLLEN: ICD-10-CM

## 2017-12-01 DIAGNOSIS — S83.511D RUPTURE OF ANTERIOR CRUCIATE LIGAMENT OF BOTH KNEES, SUBSEQUENT ENCOUNTER: ICD-10-CM

## 2017-12-01 PROCEDURE — 99214 OFFICE O/P EST MOD 30 MIN: CPT | Mod: 25,S$GLB,, | Performed by: NURSE PRACTITIONER

## 2017-12-01 PROCEDURE — 90686 IIV4 VACC NO PRSV 0.5 ML IM: CPT | Mod: S$GLB,,, | Performed by: FAMILY MEDICINE

## 2017-12-01 PROCEDURE — 90471 IMMUNIZATION ADMIN: CPT | Mod: S$GLB,,, | Performed by: FAMILY MEDICINE

## 2017-12-01 PROCEDURE — 99999 PR PBB SHADOW E&M-EST. PATIENT-LVL IV: CPT | Mod: PBBFAC,,, | Performed by: NURSE PRACTITIONER

## 2017-12-01 RX ORDER — FLUTICASONE PROPIONATE 50 MCG
SPRAY, SUSPENSION (ML) NASAL
Qty: 1 BOTTLE | Refills: 11 | Status: SHIPPED | OUTPATIENT
Start: 2017-12-01 | End: 2019-02-20 | Stop reason: SDUPTHER

## 2017-12-01 NOTE — PATIENT INSTRUCTIONS
"  Controlling Your Cholesterol  Cholesterol is a waxy substance. It travels in your blood through the blood vessels. When you have high cholesterol, it builds up in the walls of the blood vessels. This makes the vessels narrower. Blood flow decreases. You are then at greater risk for having a heart attack or a stroke.  Good and bad cholesterol  Lipids are fats. Blood is mostly water. Fat and water don't mix. So our bodies need lipoproteins (lipids inside a protein shell) to carry the lipids. The protein shell carries its lipids through the bloodstream. There are two main kinds of lipoproteins:  · LDL (low-density lipoprotein) is known as "bad cholesterol." It mainly carries cholesterol. It delivers this cholesterol to body cells. Excess LDL cholesterol will build up in artery walls. This increases your risk for heart disease and stroke.  · HDL (high-density lipoprotein) is known as "good cholesterol." This protein shell collects excess cholesterol that LDLs have left behind on blood vessel walls. That's why high levels of HDL cholesterol can decrease your risk of heart disease and stroke.  Controlling cholesterol levels  Total cholesterol includes LDL and HDL cholesterol, as well as other fats in the bloodstream. If your total cholesterol is high, follow the steps below to help lower your total cholesterol level:  · Eat less unhealthy fat:  ¨ Cut back on saturated fats and trans (also called hydrogenated) fats by selecting lean cuts of meat, low-fat dairy, and using oils instead of solid fats. Limit baked goods, processed meats, and fried foods. A diet thats high in these fats increases your bad cholesterol. It's not enough to just cut back on foods containing cholesterol.  ¨ Eat about 2 servings of fish per week. Most fish contain omega-3 fatty acids. These help lower blood cholesterol.  ¨ Eat more whole grains and soluble fiber (such as oat bran). These lower overall cholesterol.  · Be active:  ¨ Choose an " activity you enjoy. Walking, swimming, and riding a bike are some good ways to be active.  ¨ Start at a level where you feel comfortable. Increase your time and pace a little each week.  ¨ Work up to 40 minutes of moderate to high intensity physical activity at least 3 to 4 days per week.  ¨ Remember, some activity is better than none.  ¨ If you haven't been exercising regularly, start slowly. Check with your doctor to make sure the exercise plan is right for you.  · Quit smoking. Quitting smoking can improve your lipid levels. It also lowers your risk for heart disease and stroke.  · Weight management. If you are overweight or obese, your health care provider will work with you to lose weight and lower your BMI (body mass index) to a normal or near-normal level. Making diet changes and increasing physical activity can help.  · Take medication as directed. Many people need medication to get their LDL levels to a safe level. Medication to lower cholesterol levels is effective and safe. (But taking medication is not a substitute for exercise or watching your diet!) Your doctor can tell you whether you might benefit from a cholesterol-lowering medication.  Date Last Reviewed: 5/11/2015  © 9487-8388 [employer]. [address] [place] [address], [place] [address]. All rights reserved. This information is not intended as a substitute for professional medical care. Always follow your healthcare professional's instructions.        4 Steps for Eating Healthier  Changing the way you eat can improve your health. It can lower your cholesterol and blood pressure, and help you stay at a healthy weight. Your diet doesnt have to be bland and boring to be healthy. Just watch your calories and follow these steps:    1. Eat fewer unhealthy fats  · Choose more fish and lean meats instead of fatty cuts of meat.  · Skip butter and lard, and use less margarine.  · Pass on foods that have palm, coconut, or hydrogenated oils.  · Eat  fewer high-fat dairy foods like cheese, ice cream, and whole milk.  · Get a heart-healthy cookbook and try some low-fat recipes.  2. Go light on salt  · Keep the saltshaker off the table.  · Limit high-salt ingredients, such as soy sauce, bouillon, and garlic salt.  · Instead of adding salt when cooking, season your food with herbs and flavorings. Try lemon, garlic, and onion.  · Limit convenience foods, such as boxed or canned foods and restaurant food.  · Read food labels and choose lower-sodium options.  3. Limit sugar  · Pause before you add sugars to pancakes, cereal, coffee, or tea. This includes white and brown table sugar, syrup, honey, and molasses. Cut your usual amount by half.  · Use non-sugar sweeteners. Stevia, aspartame, and sucralose can satisfy a sweet tooth without adding calories.  · Swap out sugar-filled soda and other drinks. Buy sugar-free or low-calorie beverages. Remember water is always the best choice.  · Read labels and choose foods with less added sugar. Keep in mind that dairy foods and foods with fruit will have some natural sugar.  · Cut the sugar in recipes by 1/3 to 1/2. Boost the flavor with extracts like almond, vanilla, or orange. Or add spices such as cinnamon or nutmeg.  4. Eat more fiber  · Eat fresh fruits and vegetables every day.  · Boost your diet with whole grains. Go for oats, whole-grain rice, and bran.  · Add beans and lentils to your meals.  · Drink more water to match your fiber increase. This is to help prevent constipation.  Date Last Reviewed: 5/11/2015  © 1164-2408 [employer]. [address] [place] [address], [place] [address]. All rights reserved. This information is not intended as a substitute for professional medical care. Always follow your healthcare professional's instructions.

## 2017-12-01 NOTE — PROGRESS NOTES
Subjective:       Patient ID: Shamar Anne is a 57 y.o. male.    Chief Complaint: Medication Refill (norco)    Mr. Anne presents to the clinic today for medication documentation for Norco.  He has chronic ACL tears bilaterally and has been advised to have knee replacement. He is not ready for that yet.  He states winter is worse for his knee pain.  He had elevated lipids on recent labs.  His PCP advised increasing fiber and decreasing saturated fats in his diet.  He stays active but does not do formal exercise.  His AST was also slightly elevated.  He does occasionally drink alcohol.  He needs his flu shot.      Review of Systems   Constitutional: Negative for chills and fever.   HENT: Negative for congestion, ear pain and sinus pressure.    Eyes: Negative for visual disturbance.   Respiratory: Negative for cough, shortness of breath and wheezing.    Cardiovascular: Negative for chest pain, palpitations and leg swelling.   Gastrointestinal: Negative for abdominal pain, constipation and diarrhea.   Musculoskeletal: Positive for arthralgias (bilat knees). Negative for back pain and gait problem.       Objective:      Physical Exam   Constitutional: He is oriented to person, place, and time. He appears well-developed and well-nourished. No distress.   HENT:   Head: Normocephalic and atraumatic.   Right Ear: External ear normal.   Left Ear: External ear normal.   Mouth/Throat: Oropharynx is clear and moist. No oropharyngeal exudate.   Eyes: Pupils are equal, round, and reactive to light. Right eye exhibits no discharge. Left eye exhibits no discharge.   Neck: Neck supple. No thyromegaly present.   Cardiovascular: Normal rate and regular rhythm.  Exam reveals no gallop and no friction rub.    No murmur heard.  Pulmonary/Chest: Effort normal and breath sounds normal. No respiratory distress. He has no wheezes. He has no rales.   Abdominal: Soft. He exhibits no distension. There is no tenderness.   Lymphadenopathy:      He has no cervical adenopathy.   Neurological: He is alert and oriented to person, place, and time. Coordination normal.   Skin: Skin is warm and dry.   Psychiatric: He has a normal mood and affect. His behavior is normal. Thought content normal.   Vitals reviewed.          Current Outpatient Prescriptions:     amlodipine (NORVASC) 10 MG tablet, Take 1 tablet (10 mg total) by mouth once daily., Disp: 90 tablet, Rfl: 3    cetirizine (ZYRTEC) 10 MG tablet, , Disp: , Rfl:     diclofenac (VOLTAREN) 75 MG EC tablet, TAKE ONE TABLET BY MOUTH TWO TIMES A DAY AS NEEDED, Disp: 45 tablet, Rfl: 2    fluticasone (FLONASE) 50 mcg/actuation nasal spray, Use two sprays in each nostril once daily for one week, then one spray in each nostril daily., Disp: 1 Bottle, Rfl: 11    hydrocodone-acetaminophen 7.5-325mg (NORCO) 7.5-325 mg per tablet, Take 1 tablet by mouth every 8 (eight) hours as needed., Disp: 90 tablet, Rfl: 0    hydrocodone-acetaminophen 7.5-325mg (NORCO) 7.5-325 mg per tablet, Take 1 tablet by mouth every 8 (eight) hours as needed., Disp: 90 tablet, Rfl: 0    hydrocodone-acetaminophen 7.5-325mg (NORCO) 7.5-325 mg per tablet, Take 1 tablet by mouth every 8 (eight) hours as needed., Disp: 90 tablet, Rfl: 0    pantoprazole (PROTONIX) 40 MG tablet, Take 1 tablet (40 mg total) by mouth once daily., Disp: 90 tablet, Rfl: 1    psyllium (METAMUCIL) 0.52 gram capsule, Take 4 capsules (2.08 g total) by mouth once daily., Disp: 120 capsule, Rfl: 11    VIAGRA 100 mg tablet, , Disp: , Rfl:   Assessment:       1. Essential hypertension    2. Rupture of anterior cruciate ligament of both knees, subsequent encounter    3. Dyslipidemia    4. Chronic seasonal allergic rhinitis due to pollen        Plan:       Essential hypertension  Stable on current medication.    Rupture of anterior cruciate ligament of both knees, subsequent encounter  Pain contract updated today.  UDS 6/1/17   checked with no evidence of  diversion.  Refills sent to PCP.    Dyslipidemia  Increase fiber--plain oatmeal in the mornings with fruit  Increase vegetables.  Decrease saturated fats.  Increase healthy fats such as olive oil, avocado, canola oil, nuts.  Avoid alcohol.  Increase exercise.  Repeat labs in 6 mos.    Chronic seasonal allergic rhinitis due to pollen  Stable, needs refill.  -     fluticasone (FLONASE) 50 mcg/actuation nasal spray; Use two sprays in each nostril once daily for one week, then one spray in each nostril daily.  Dispense: 1 Bottle; Refill: 11    Patient readiness: acceptance and barriers:none    During the course of the visit the patient was educated and counseled about the following:     Hypertension:   Medication: no change.    Goals: Hypertension: Reduce Blood Pressure    Did patient meet goals/outcomes: Yes    The following self management tools provided: declined    Patient Instructions (the written plan) was given to the patient/family.     Time spent with patient: 15 minutes

## 2017-12-03 RX ORDER — HYDROCODONE BITARTRATE AND ACETAMINOPHEN 7.5; 325 MG/1; MG/1
1 TABLET ORAL EVERY 8 HOURS PRN
Qty: 90 TABLET | Refills: 0 | Status: SHIPPED | OUTPATIENT
Start: 2018-02-01 | End: 2018-02-26 | Stop reason: SDUPTHER

## 2017-12-03 RX ORDER — HYDROCODONE BITARTRATE AND ACETAMINOPHEN 7.5; 325 MG/1; MG/1
1 TABLET ORAL EVERY 8 HOURS PRN
Qty: 90 TABLET | Refills: 0 | Status: SHIPPED | OUTPATIENT
Start: 2018-01-01 | End: 2018-02-26 | Stop reason: SDUPTHER

## 2017-12-03 RX ORDER — HYDROCODONE BITARTRATE AND ACETAMINOPHEN 7.5; 325 MG/1; MG/1
1 TABLET ORAL EVERY 8 HOURS PRN
Qty: 90 TABLET | Refills: 0 | Status: SHIPPED | OUTPATIENT
Start: 2017-12-03 | End: 2018-02-26 | Stop reason: SDUPTHER

## 2018-02-26 ENCOUNTER — OFFICE VISIT (OUTPATIENT)
Dept: FAMILY MEDICINE | Facility: CLINIC | Age: 58
End: 2018-02-26
Payer: MEDICARE

## 2018-02-26 VITALS
SYSTOLIC BLOOD PRESSURE: 124 MMHG | HEART RATE: 68 BPM | HEIGHT: 71 IN | DIASTOLIC BLOOD PRESSURE: 82 MMHG | TEMPERATURE: 98 F | BODY MASS INDEX: 30.27 KG/M2 | WEIGHT: 216.25 LBS

## 2018-02-26 DIAGNOSIS — I10 ESSENTIAL HYPERTENSION: Primary | Chronic | ICD-10-CM

## 2018-02-26 DIAGNOSIS — E78.2 MIXED DYSLIPIDEMIA: Chronic | ICD-10-CM

## 2018-02-26 DIAGNOSIS — R97.20 ABNORMAL PSA: ICD-10-CM

## 2018-02-26 DIAGNOSIS — M17.0 OSTEOARTHRITIS OF BOTH KNEES, UNSPECIFIED OSTEOARTHRITIS TYPE: Chronic | ICD-10-CM

## 2018-02-26 PROCEDURE — 99214 OFFICE O/P EST MOD 30 MIN: CPT | Mod: S$PBB,,, | Performed by: FAMILY MEDICINE

## 2018-02-26 PROCEDURE — 99999 PR PBB SHADOW E&M-EST. PATIENT-LVL III: CPT | Mod: PBBFAC,,, | Performed by: FAMILY MEDICINE

## 2018-02-26 PROCEDURE — 99213 OFFICE O/P EST LOW 20 MIN: CPT | Mod: PBBFAC,PO | Performed by: FAMILY MEDICINE

## 2018-02-26 RX ORDER — HYDROCODONE BITARTRATE AND ACETAMINOPHEN 7.5; 325 MG/1; MG/1
1 TABLET ORAL EVERY 8 HOURS PRN
Qty: 90 TABLET | Refills: 0 | Status: SHIPPED | OUTPATIENT
Start: 2018-03-26 | End: 2018-03-12

## 2018-02-26 RX ORDER — HYDROCODONE BITARTRATE AND ACETAMINOPHEN 7.5; 325 MG/1; MG/1
1 TABLET ORAL EVERY 8 HOURS PRN
Qty: 90 TABLET | Refills: 0 | Status: SHIPPED | OUTPATIENT
Start: 2018-02-26 | End: 2018-05-22 | Stop reason: SDUPTHER

## 2018-02-26 RX ORDER — HYDROCODONE BITARTRATE AND ACETAMINOPHEN 7.5; 325 MG/1; MG/1
1 TABLET ORAL EVERY 8 HOURS PRN
Qty: 90 TABLET | Refills: 0 | Status: SHIPPED | OUTPATIENT
Start: 2018-04-26 | End: 2018-03-12

## 2018-02-26 NOTE — PROGRESS NOTES
Subjective:       Patient ID: Shamar Anne is a 58 y.o. male.    Chief Complaint: Hypertension    Hypertension   This is a chronic problem. The problem has been rapidly improving since onset. The problem is controlled. Pertinent negatives include no chest pain, headaches, palpitations or shortness of breath. Agents associated with hypertension include NSAIDs. Risk factors for coronary artery disease include male gender, obesity and stress. The current treatment provides moderate improvement. Compliance problems include exercise.      Review of Systems   Constitutional: Negative for fatigue and unexpected weight change.   Respiratory: Negative for chest tightness and shortness of breath.    Cardiovascular: Negative for chest pain, palpitations and leg swelling.   Gastrointestinal: Negative for abdominal pain.   Musculoskeletal: Negative for arthralgias.   Neurological: Negative for dizziness, syncope, light-headedness and headaches.       Patient Active Problem List   Diagnosis    DJD (degenerative joint disease) of knee    ACL (anterior cruciate ligament) rupture    HTN (hypertension)    OA (osteoarthritis) of knee    Dyslipidemia    BMI 30.0-30.9,adult    Mixed dyslipidemia       Objective:      Physical Exam   Constitutional: He is oriented to person, place, and time. He appears well-developed and well-nourished.   Cardiovascular: Normal rate, regular rhythm and normal heart sounds.    Pulmonary/Chest: Effort normal and breath sounds normal.   Musculoskeletal: He exhibits edema and tenderness.   Chronic neck and low back pain.   Neurological: He is alert and oriented to person, place, and time.   Skin: Skin is warm and dry.   Psychiatric: He has a normal mood and affect.   Nursing note and vitals reviewed.      Lab Results   Component Value Date    WBC 6.00 11/20/2017    HGB 15.4 11/20/2017    HCT 45.4 11/20/2017     11/20/2017    CHOL 126 11/20/2017    TRIG 308 (H) 11/20/2017    HDL 21 (L)  11/20/2017    ALT 28 11/20/2017    AST 45 (H) 11/20/2017     11/20/2017    K 3.9 11/20/2017     11/20/2017    CREATININE 0.9 11/20/2017    BUN 11 11/20/2017    CO2 27 11/20/2017    TSH 0.927 08/01/2014     The ASCVD Risk score (Daren NÚÑEZ Jr., et al., 2013) failed to calculate for the following reasons:    The valid total cholesterol range is 130 to 320 mg/dL    Assessment:       1. Essential hypertension    2. Mixed dyslipidemia    3. BMI 30.0-30.9,adult    4. Osteoarthritis of both knees, unspecified osteoarthritis type    5. Abnormal PSA        Plan:       Essential hypertension    Mixed dyslipidemia  -     Lipid panel; Future; Expected date: 02/26/2018  -     Hepatic function panel; Future; Expected date: 02/26/2018    BMI 30.0-30.9,adult    Osteoarthritis of both knees, unspecified osteoarthritis type  -     hydrocodone-acetaminophen 7.5-325mg (NORCO) 7.5-325 mg per tablet; Take 1 tablet by mouth every 8 (eight) hours as needed.  Dispense: 90 tablet; Refill: 0  -     hydrocodone-acetaminophen 7.5-325mg (NORCO) 7.5-325 mg per tablet; Take 1 tablet by mouth every 8 (eight) hours as needed.  Dispense: 90 tablet; Refill: 0  -     hydrocodone-acetaminophen 7.5-325mg (NORCO) 7.5-325 mg per tablet; Take 1 tablet by mouth every 8 (eight) hours as needed.  Dispense: 90 tablet; Refill: 0    Abnormal PSA  -     PSA, Screening; Future; Expected date: 02/26/2018      Patient readiness: acceptance and barriers:readiness    During the course of the visit the patient was educated and counseled about the following:     Hypertension:   Dietary sodium restriction.  Regular aerobic exercise.    Goals: Hypertension: Reduce Blood Pressure and Obesity: Reduce calorie intake and BMI    Did patient meet goals/outcomes: Yes    The following self management tools provided: blood pressure log  excercise log    Patient Instructions (the written plan) was given to the patient/family.     Time spent with patient: 30  minutes    Barriers to medications present (yes )    Adverse reactions to current medications (no)    Over the counter medications reviewed (Yes)        30-minute visit. 25 minutes spent counseling patient on diet, exercise, and weight loss.  This has been fully explained to the patient, who indicates understanding.

## 2018-02-26 NOTE — PATIENT INSTRUCTIONS
Lifestyle Changes to Control Cholesterol  You can control your cholesterol through diet, exercise, weight management, quitting smoking, stress management, and taking your medicines right. These things can also lower your risk for cardiovascular disease.    Eating healthy  Your healthcare provider will give you information on diet changes you may need to make. Your provider may recommend that you see a registered dietitian for help with diet changes. Changes may include:  · Cutting back on the amount of fat and cholesterol in your meals  · Eating less salt (sodium). This is especially important if you have high blood pressure.  · Eating more fresh vegetables and fruits  · Eating lean proteins such as fish, poultry, beans, and peas  · Eating less red meat and processed meats  · Using low-fat dairy products  · Using vegetable and nut oils in limited amounts  · Limiting how many sweets and processed foods like chips, cookies, and baked goods that you eat   · Limiting how many sugar-sweetened beverages you drink  · Limiting how often you eat out  Getting exercise  Regular exercise is a good way to help your body control cholesterol. Regular exercise can help in many ways. It can:  · Raise your good cholesterol  · Help lower your bad cholesterol  · Let blood flow better through your body  · Give more oxygen to your muscles and tissues  · Help you manage your weight  · Help your heart pump better  · Lower your blood pressure  Your healthcare provider may recommend that you get more physical activity if you haven't been active. Your provider may recommend that you get moderate to vigorous physical activity for at least 40 minutes each day. You should do this for at least 3 to 4 days each week. A few examples of moderate to vigorous activity are:  · Walking at a brisk pace. This is about 3 to 4 miles per hour.  · Jogging or running  · Swimming or water aerobics  · Hiking  · Dancing  · Martial arts  · Tennis  · Riding a  bicycle or stationary bike  · Dancing  Managing your weight  If you are overweight or obese, your healthcare provider will work with you to help you lose weight and lower your BMI (body mass index). Making diet changes and getting more physical activity can help. Changing your diet will help you lose weight more easily than adding exercise.  Quitting smoking  Smoking and other tobacco use can raise cholesterol and make it harder to control. Quitting is tough. But millions of people have given up tobacco for good. You can quit, too! Think about some of the reasons below to quit smoking. Do any of them make you think twice about your smoking habit?  Stop smoking because it:  · Keeps your cholesterol high, even if you make all the other changes youre supposed to  · Damages your body. It especially harms your heart, lungs, skin, and blood vessels.  · Makes you more likely to have a heart attack (acute myocardial infarction), stroke, or cancer  · Stains your teeth  · Makes your skin, clothes, and breath smell bad  · Costs a lot of money  Controlling stress   Learn ways to control stress. This will help you deal with stress in your home and work life. Controlling stress can greatly lower your risk of getting cardiovascular disease.  Making the most of medicines  Healthy eating and exercise are a good start to keeping your cholesterol down. But you may need some extra help from medicine. If your doctor prescribes medicine, be sure to take it exactly as directed. Remember:  · Tell your healthcare provider about all other medicines you take. This includes vitamins and herbs.  · Tell your healthcare provider if you have any side effects after starting to take a medicine. Examples of side effects to watch for include muscle aches, weakness, blurred vision, rust-colored urine, yellowing of eyes or skin (jaundice), and headache.  · Dont skip a dose or stop taking your medicine because you feel better or because  your cholesterol numbers go down. Never stop taking your medicine unless your healthcare provider has told you its OK.  · Ask your healthcare provider if you have any questions about your medicines.  High risk groups  Some people may need to take medicines called statins to control their cholesterol. This is in addition to eating a healthy diet and getting regular exercise.  Statins can help you stay healthy. They can also help prevent a heart attack or stroke. You may need to take a statin if you are in one of these groups:  · Adults who have had a heart attack or stroke. Or adults who have had peripheral vascular disease, a ministroke (transient ischemic attack), or stable or unstable angina. This group also includes people who have had a procedure to restore blood flow through a blocked artery. These procedures include percutaneous coronary intervention, angioplasty, stent, and open-heart bypass surgery.  · Adults who have diabetes. Or adults who are at higher risk of having a heart attack or stroke and have an LDL cholesterol level of 70 to 189 mg/dL  · Adults who are 21 years old or older and have an LDL cholesterol level of 190 mg/dL or higher.  If you are in a high-risk group, talk with your healthcare provider about your treatment goals. Make sure you understand why these goals are important, based on your own health history and your family history of heart disease or high cholesterol.  Make a plan to have regular cholesterol checks. You may need to fast before getting this test. Also ask your provider about any side effects your medicines may cause. Let your provider know about any side effects you have. You may need to take more than one medicine to reach the cholesterol goals that you and your provider decide on.  Date Last Reviewed: 10/1/2016  © 7445-0383 [employer]. [address] [place] [address], [place] [address]. All rights reserved. This information is not intended as a substitute for  professional medical care. Always follow your healthcare professional's instructions.        Diabetes and Heart Disease     Take your medicines as directed each day, even if you feel fine.   If you have diabetes, you are two to four times more likely to have heart disease than someone without diabetes. This higher risk is due to diabetes, but it is also due to other risk factors for heart disease that happen in people with diabetes. But theres good news. You can help control your health risks by making some changes in your life. You can take steps to reduce your risk of heart disease by half--similar to the risk in people who don't have diabetes.  Your main risk factors  Three major risk factors for heart disease are high blood sugar, high blood pressure, and high levels of lipids. By keeping risk factors under control, you can help keep your heart and arteries healthy. This may reduce your chances of a heart attack.  · Blood sugar. High blood sugar can make artery walls tough and rough. Plaque (waxy material in the blood) can then build up along the artery walls, making it harder for blood to flow through the arteries. Having high blood sugar increases the chances of having high blood pressure and high cholesterol.  · Blood pressure. When blood pressure is high all the time it causes your heart to work harder to pump blood. Artery walls become damaged. This increases the risk for plaque build up.  · Lipids. The body needs some lipids in the blood to stay healthy. But lipid levels that are too high can damage the artery walls. Lipids include cholesterol and triglycerides. There are two kinds of cholesterol. LDL (bad) cholesterol can damage the arteries. But HDL (good) cholesterol helps clear LDL cholesterol from the blood vessels. This helps keep the arteries healthy. When blood sugar is high, the level of triglycerides in the blood may also be high. High blood triglyceride levels can cause plaque to  form.   Other risk factors  Certain lifestyle factors can increase levels of your blood sugar, blood pressure, and lipids. Such increases raise your risk of heart disease:  · Smoking damages the lining of your arteries. This allows plaque to build up in the artery walls. Smoking also constricts (narrows) the arteries. This can raise blood pressure and cause chest pain or angina. Smoking also increases your risk of getting type 2 diabetes.  · Not being active makes it harder for your heart to do its work. Inactivity is linked to many other risk factors, such as high blood pressure and poor cholesterol levels. Inactivity also increases your risk of getting type 2 diabetes.  · Being overweight makes it harder for your body to use insulin. It also makes your heart work too hard. Being overweight is also the main contributor to the development of type 2 diabetes,   Changes you can make  Following a few simple steps can help keep your risk factors under control. Work with your healthcare team to reach your goals.  · Quitting smoking could save your life. Smoking damages the lining of the blood vessels and raises blood pressure. Smoking also affects how your body uses insulin. This makes it harder to keep blood sugar under control. If you smoke and need help quitting, talk to your healthcare team.   · Testing your blood sugar is the only way to know whether it is under control. Be sure to test your blood sugar yourself. Also get your blood tested in the lab, as directed.  · Monitoring your blood pressure and lipid levels can help you achieve safe levels. Visit your healthcare team as scheduled.  · Taking medicines as directed can help control blood sugar, blood pressure, blood clotting, and/or cholesterol levels.  · Eating right can reduce your risk factors and help you lose weight. Try to limit the amount of processed or refined carbohydrates you eat at one time. Cut back on your total calorie intake. Eat foods low in  saturated fat and cholesterol. Eat fiber, including vegetables and whole grains, and cut down on salt. A dietitian or diabetes educator can help form a meal plan that works for you--even if you are on a low budget.   · Being active can help reduce your weight, strengthen your heart, and lower your lipid levels and blood pressure. Exercise and activity are good for your whole body. Talk to your healthcare team about increasing your activity safely over time.  · Keeping your appointments with your healthcare provider helps you stay healthy. Go in for checkups and lab tests as scheduled.  Date Last Reviewed: 5/19/2016 © 2000-2017 [employer]. [address] [place] [address], [place] [address]. All rights reserved. This information is not intended as a substitute for professional medical care. Always follow your healthcare professional's instructions.        Eating a Vegetarian Diet  A vegetarian diet is based on plant foods. It includes fruits, vegetables, beans, grains, seeds, and nuts. Some vegetarians also eat dairy foods and eggs. There are three common vegetarian diets:  · Lacto-ovo vegetarians eat eggs, yogurt, cheese, and other milk products, as well as plant foods.  · Lacto vegetarians eat dairy and plant foods but not eggs.  · Vegans eat only plant foods.  Why eat vegetarian?  People choose to be vegetarians for health, cultural, social, and Druze reasons. A vegetarian diet is a healthy way to eat. You just have to plan your meals carefully so that you get all the nutrients you need. Most vegetarian diets are high in fiber and low in fat and cholesterol. That means eating vegetarian can:  · Lower your risk of heart disease.  · Lower your blood pressure and cholesterol levels.  · Help you maintain a healthy weight.  · Decrease digestive problems including:  ¨ Bowel diseases  ¨ Gallstones  ¨ Colon cancer  Vegetarian basics  A vegetarian diet can be a healthy way to eat for people of all ages. But meals  and snacks must be planned to include non-meat sources of protein, vitamins, and other nutrients. (See the chart below.) Here are some guidelines for healthy meal planning:  · Eat a wide range of foods. This will help you get all the nutrients you need.  · Eat a number of plant proteins throughout the day.  · Plan for enough calories each day. Also make sure that your calories come from foods that are rich in protein, vitamins, and minerals.  · If you eat dairy foods, choose low-fat or fat-free milk, yogurt, or cheese.  Do you need supplements?  A vegetarian diet can easily supply all the calories, protein, vitamins, and minerals that a person needs. But some people have special needs. They may include children and teens, pregnant and lactating women, women past midlife, the elderly, and vegans. If you are in one of these groups, you may need extra calories, protein, calcium, iron, vitamin B12, or zinc. The lists below can help you choose foods that are good sources of these nutrients. And be sure to ask your health care provider about taking vitamin supplements.  Protein  · Dried beans, soybeans, and lentils  · Tofu (bean curd) and tempeh (cultured soybeans)  · Rice, barley, and other whole grains  · Nuts and nut butter  · Milk, yogurt, and cheese  · Eggs Vitamin B12  · Milk, yogurt, and cheese  · Eggs  · Fortified soy burgers  · Fortified soy milk or other nondairy milk  · Fortified cereals  · Nutritional yeast   Zinc  · Milk, yogurt, and cheese  · Eggs  · Canned or dried beans  · Lentils and split peas  · Wheat germ  · Whole-grain breads and cereals  · Nuts and nut butters  · Pumpkin and sunflower seeds Calcium  · Milk, yogurt, and cheese  · Fortified soy milk or other nondairy milk  · Tofu processed with calcium sulfate  · Leafy, dark-green vegetables  · Dried figs  · Fortified orange juice and fortified cereals  · Sesame seeds  · Beans   Iron  · Wheat germ  · Dried fruits  · Nuts and seeds  · Whole grain and  fortified breads and cereals  · Dried beans, lentils, and split peas  · Leafy, dark-green vegetables  · Eggs     Getting started  Change to a vegetarian diet slowly. Start by eating more grains, beans, vegetables, and fruits. Make fish, poultry, or meat a side dish. Then slowly cut them out of your diet. Here are some other tips:  · Eat three or more servings of vegetables a day. Eat them raw or lightly steamed.  · Eat two or more servings of fruit a day. Choose whole fruits with the skin on.  · Choose a wide range of grains and whole-grain breads and cereals. Eat six or more servings of these foods each day.  · Begin to replace meat by working up to two to three servings a day of beans, lentils, split peas, tofu, or tempeh.  · If you eat dairy foods, have two to three servings a day. Make low-fat or fat-free choices.  For vegans: Add sources of calcium and vitamin B12, such as fortified nondairy milks and breakfast cereals. Talk to your health care provider about vitamin supplements.  To learn more  A registered dietitian (RD) can help you plan a healthy vegetarian diet. For more information and to find an RD who knows about vegetarian diets, search for one through the Vegetarian Nutrition Dietetic Practice Group of the Academy of Nutrition and Dietetics (AND) at their website, www.vegetariannutrition.net. You can also search AND's website, www.eatright.org. Other groups that can help include:  · Vegetarian Resource Group (www.vrg.org)  · American Cancer Society (www.cancer.org)  · American Heart Association (www.heart.org)  Date Last Reviewed: 6/1/2015  © 4538-9077 [employer]. [address] [place] [address], [place] [address]. All rights reserved. This information is not intended as a substitute for professional medical care. Always follow your healthcare professional's instructions.

## 2018-03-12 ENCOUNTER — OFFICE VISIT (OUTPATIENT)
Dept: INTERNAL MEDICINE | Facility: CLINIC | Age: 58
End: 2018-03-12
Payer: MEDICARE

## 2018-03-12 VITALS
TEMPERATURE: 98 F | SYSTOLIC BLOOD PRESSURE: 138 MMHG | WEIGHT: 211 LBS | HEART RATE: 78 BPM | OXYGEN SATURATION: 98 % | BODY MASS INDEX: 29.54 KG/M2 | DIASTOLIC BLOOD PRESSURE: 90 MMHG | HEIGHT: 71 IN

## 2018-03-12 DIAGNOSIS — I10 ESSENTIAL HYPERTENSION: Chronic | ICD-10-CM

## 2018-03-12 DIAGNOSIS — J30.9 CHRONIC ALLERGIC RHINITIS, UNSPECIFIED SEASONALITY, UNSPECIFIED TRIGGER: Primary | ICD-10-CM

## 2018-03-12 PROCEDURE — 99213 OFFICE O/P EST LOW 20 MIN: CPT | Mod: 25,,, | Performed by: INTERNAL MEDICINE

## 2018-03-12 PROCEDURE — 96372 THER/PROPH/DIAG INJ SC/IM: CPT | Mod: ,,, | Performed by: INTERNAL MEDICINE

## 2018-03-12 RX ORDER — METHYLPREDNISOLONE ACETATE 80 MG/ML
80 INJECTION, SUSPENSION INTRA-ARTICULAR; INTRALESIONAL; INTRAMUSCULAR; SOFT TISSUE
Status: COMPLETED | OUTPATIENT
Start: 2018-03-12 | End: 2018-03-12

## 2018-03-12 RX ORDER — MONTELUKAST SODIUM 10 MG/1
10 TABLET ORAL NIGHTLY
Qty: 30 TABLET | Refills: 3 | Status: SHIPPED | OUTPATIENT
Start: 2018-03-12 | End: 2018-09-16 | Stop reason: SDUPTHER

## 2018-03-12 RX ADMIN — METHYLPREDNISOLONE ACETATE 80 MG: 80 INJECTION, SUSPENSION INTRA-ARTICULAR; INTRALESIONAL; INTRAMUSCULAR; SOFT TISSUE at 03:03

## 2018-03-12 NOTE — PROGRESS NOTES
Subjective:       Patient ID: Shamar Anne is a 40 y.o. male.    Chief Complaint: Dizziness (stuffy ears) and Headache    Sinus Problem   This is a chronic problem. The current episode started in the past 7 days (long history of allergies/ sinus problems;  he is concerned about possible infection due to dizziness which started yesterday.  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 There has been no fever. Associated symptoms include congestion (more so than normal last 2 weeks), ear pain, headaches and sinus pressure. Pertinent negatives include no chills, coughing, diaphoresis, neck pain, shortness of breath, sore throat or swollen glands. (Dizziness started yesterday.  ) Past treatments include saline sprays (used she pot last night and it helped.  On zyrtec but hasn't been using flonase). The treatment provided mild relief.     Review of Systems   Constitutional: Negative for chills, diaphoresis, fatigue, fever and unexpected weight change.   HENT: Positive for congestion (more so than normal last 2 weeks), ear pain, hearing loss, postnasal drip and sinus pressure. Negative for ear discharge, rhinorrhea, sore throat, trouble swallowing and voice change.    Eyes: Positive for itching. Negative for photophobia, pain, discharge, redness and visual disturbance.   Respiratory: Negative for apnea, cough, choking, chest tightness, shortness of breath and wheezing.    Cardiovascular: Negative for chest pain, palpitations and leg swelling.   Gastrointestinal: Positive for nausea. Negative for abdominal pain, blood in stool, constipation, diarrhea, rectal pain and vomiting.   Endocrine: Negative for cold intolerance, heat intolerance, polydipsia and polyuria.   Genitourinary: Negative for decreased urine volume, difficulty urinating, discharge, dysuria, flank pain, frequency, genital sores, hematuria, penile pain, penile swelling, scrotal swelling, testicular pain and urgency.   Musculoskeletal:  Negative for arthralgias, back pain, gait problem, joint swelling, myalgias, neck pain and neck stiffness.   Skin: Negative for color change, rash and wound.   Allergic/Immunologic: Negative for environmental allergies and food allergies.   Neurological: Positive for dizziness, light-headedness and headaches. Negative for tremors, seizures, syncope, facial asymmetry, speech difficulty, weakness and numbness.   Hematological: Negative for adenopathy. Does not bruise/bleed easily.   Psychiatric/Behavioral: Negative for confusion, hallucinations, sleep disturbance and suicidal ideas. The patient is nervous/anxious.        Past Medical History:   Diagnosis Date    ACL (anterior cruciate ligament) rupture     both knees    Degenerative disc disease     Depression     DJD (degenerative joint disease) of knee     both Knees both    GERD (gastroesophageal reflux disease)     Hypertension       Past Surgical History:   Procedure Laterality Date    WISDOM TOOTH EXTRACTION         Family History   Problem Relation Age of Onset    Hypertension Mother     Stroke Father     Hypertension Brother     Hypertension Brother     Cancer Neg Hx     Early death Neg Hx        Social History     Social History    Marital status:      Spouse name: N/A    Number of children: N/A    Years of education: N/A     Occupational History    contractor      Social History Main Topics    Smoking status: Former Smoker     Quit date: 8/21/1999    Smokeless tobacco: Never Used    Alcohol use 1.2 oz/week     2 Cans of beer per week      Comment: occasional    Drug use: No    Sexual activity: Yes     Partners: Female     Birth control/ protection: Condom     Other Topics Concern    None     Social History Narrative    None       Current Outpatient Prescriptions   Medication Sig Dispense Refill    amlodipine (NORVASC) 10 MG tablet Take 1 tablet (10 mg total) by mouth once daily. 90 tablet 3    cetirizine (ZYRTEC) 10 MG  "tablet       fluticasone (FLONASE) 50 mcg/actuation nasal spray Use two sprays in each nostril once daily for one week, then one spray in each nostril daily. 1 Bottle 11    hydrocodone-acetaminophen 7.5-325mg (NORCO) 7.5-325 mg per tablet Take 1 tablet by mouth every 8 (eight) hours as needed. 90 tablet 0    psyllium (METAMUCIL) 0.52 gram capsule Take 4 capsules (2.08 g total) by mouth once daily. 120 capsule 11    VIAGRA 100 mg tablet       montelukast (SINGULAIR) 10 mg tablet Take 1 tablet (10 mg total) by mouth every evening. 30 tablet 3    pantoprazole (PROTONIX) 40 MG tablet Take 1 tablet (40 mg total) by mouth once daily. 90 tablet 1     Current Facility-Administered Medications   Medication Dose Route Frequency Provider Last Rate Last Dose    methylPREDNISolone acetate injection 80 mg  80 mg Intramuscular 1 time in Clinic/HOD Stewart Perla Jr., MD           Review of patient's allergies indicates:   Allergen Reactions    Diovan [valsartan]      Objective:    HPI     Dizziness    Additional comments: stuffy ears       Last edited by Art Farooq MA on 3/12/2018  2:58 PM. (History)      Blood pressure (!) 138/90, pulse 78, temperature 98.1 °F (36.7 °C), temperature source Temporal, height 5' 11" (1.803 m), weight 95.7 kg (211 lb), SpO2 98 %. Body mass index is 29.43 kg/m².   Physical Exam   Constitutional: He appears well-developed. He is active.  Non-toxic appearance. He does not have a sickly appearance. He does not appear ill. No distress.   HENT:   Head: Normocephalic.   Right Ear: Tympanic membrane, external ear and ear canal normal.   Left Ear: Tympanic membrane, external ear and ear canal normal.   Nose: Nose normal. No rhinorrhea. Right sinus exhibits no maxillary sinus tenderness and no frontal sinus tenderness. Left sinus exhibits no maxillary sinus tenderness and no frontal sinus tenderness.   Mouth/Throat: Oropharynx is clear and moist and mucous membranes are normal. No oropharyngeal " exudate or posterior oropharyngeal erythema. No tonsillar exudate.   Cardiovascular: Normal rate, regular rhythm and normal heart sounds.  Exam reveals no gallop and no friction rub.    No murmur heard.  Pulmonary/Chest: Effort normal and breath sounds normal. No accessory muscle usage. No tachypnea. No respiratory distress. He has no wheezes. He has no rhonchi. He has no rales.   Lymphadenopathy:        Head (right side): Submandibular adenopathy present.   Neurological: He is alert.   Skin: He is not diaphoretic.           Assessment:       1. Chronic allergic rhinitis, unspecified seasonality, unspecified trigger    2. Essential hypertension        Plan:       Shamar was seen today for dizziness and headache.    Diagnoses and all orders for this visit:    Chronic allergic rhinitis, unspecified seasonality, unspecified trigger  Comments:  Encouraged nasal steroid use.  Add singulair    Orders:  -     montelukast (SINGULAIR) 10 mg tablet; Take 1 tablet (10 mg total) by mouth every evening.  -     methylPREDNISolone acetate injection 80 mg; Inject 1 mL (80 mg total) into the muscle one time.    Essential hypertension  Comments:  PCP is Dr. Magana.

## 2018-05-22 DIAGNOSIS — M17.0 OSTEOARTHRITIS OF BOTH KNEES, UNSPECIFIED OSTEOARTHRITIS TYPE: Chronic | ICD-10-CM

## 2018-05-22 NOTE — TELEPHONE ENCOUNTER
Pt is requesting medication refill on Norco 7.5-325 mg   Last refill: 2/26/18  Last visit: 2/26/18  Follow Up: no follow up scheduled    ----- Message from Barrett Mack sent at 5/22/2018 12:25 PM CDT -----  Contact: Shamar blair on his Rx hydrocodone-acetaminophen 7.5-325mg (NORCO) 7.5-325 mg per tablet 90 tablet   He is asking to have it sent to     [facility] [address] [place] [address] & [address] [address]  Phone: [phone] Fax: [phone]    Please call him when sent [phone] (home)   thanks

## 2018-05-24 RX ORDER — HYDROCODONE BITARTRATE AND ACETAMINOPHEN 7.5; 325 MG/1; MG/1
1 TABLET ORAL EVERY 8 HOURS PRN
Qty: 90 TABLET | Refills: 0 | Status: SHIPPED | OUTPATIENT
Start: 2018-05-24 | End: 2018-06-29 | Stop reason: SDUPTHER

## 2018-05-29 ENCOUNTER — LAB VISIT (OUTPATIENT)
Dept: LAB | Facility: HOSPITAL | Age: 58
End: 2018-05-29
Attending: FAMILY MEDICINE
Payer: MEDICARE

## 2018-05-29 DIAGNOSIS — E78.2 MIXED DYSLIPIDEMIA: Chronic | ICD-10-CM

## 2018-05-29 DIAGNOSIS — R97.20 ABNORMAL PSA: ICD-10-CM

## 2018-05-29 LAB
ALBUMIN SERPL BCP-MCNC: 4.1 G/DL
ALP SERPL-CCNC: 78 U/L
ALT SERPL W/O P-5'-P-CCNC: 22 U/L
AST SERPL-CCNC: 21 U/L
BILIRUB DIRECT SERPL-MCNC: 0.4 MG/DL
BILIRUB SERPL-MCNC: 1 MG/DL
CHOLEST SERPL-MCNC: 186 MG/DL
CHOLEST/HDLC SERPL: 4.8 {RATIO}
COMPLEXED PSA SERPL-MCNC: 2.9 NG/ML
HDLC SERPL-MCNC: 39 MG/DL
HDLC SERPL: 21 %
LDLC SERPL CALC-MCNC: 118.6 MG/DL
NONHDLC SERPL-MCNC: 147 MG/DL
PROT SERPL-MCNC: 7.6 G/DL
TRIGL SERPL-MCNC: 142 MG/DL

## 2018-05-29 PROCEDURE — 36415 COLL VENOUS BLD VENIPUNCTURE: CPT | Mod: PO

## 2018-05-29 PROCEDURE — 80076 HEPATIC FUNCTION PANEL: CPT

## 2018-05-29 PROCEDURE — 84153 ASSAY OF PSA TOTAL: CPT

## 2018-05-29 PROCEDURE — 80061 LIPID PANEL: CPT

## 2018-06-04 ENCOUNTER — DOCUMENTATION ONLY (OUTPATIENT)
Dept: FAMILY MEDICINE | Facility: CLINIC | Age: 58
End: 2018-06-04

## 2018-06-04 ENCOUNTER — TELEPHONE (OUTPATIENT)
Dept: FAMILY MEDICINE | Facility: CLINIC | Age: 58
End: 2018-06-04

## 2018-06-04 ENCOUNTER — OFFICE VISIT (OUTPATIENT)
Dept: FAMILY MEDICINE | Facility: CLINIC | Age: 58
End: 2018-06-04
Payer: MEDICARE

## 2018-06-04 VITALS
TEMPERATURE: 99 F | BODY MASS INDEX: 29.14 KG/M2 | DIASTOLIC BLOOD PRESSURE: 74 MMHG | HEART RATE: 79 BPM | HEIGHT: 71 IN | WEIGHT: 208.13 LBS | SYSTOLIC BLOOD PRESSURE: 124 MMHG | OXYGEN SATURATION: 97 %

## 2018-06-04 DIAGNOSIS — B35.4 TINEA CORPORIS: ICD-10-CM

## 2018-06-04 DIAGNOSIS — L73.9 FOLLICULITIS: Primary | ICD-10-CM

## 2018-06-04 PROCEDURE — 99213 OFFICE O/P EST LOW 20 MIN: CPT | Mod: S$GLB,,, | Performed by: NURSE PRACTITIONER

## 2018-06-04 RX ORDER — DOXYCYCLINE 100 MG/1
100 CAPSULE ORAL 2 TIMES DAILY
Qty: 20 CAPSULE | Refills: 0 | Status: SHIPPED | OUTPATIENT
Start: 2018-06-04 | End: 2018-06-26

## 2018-06-04 RX ORDER — CLOTRIMAZOLE AND BETAMETHASONE DIPROPIONATE 10; .64 MG/G; MG/G
CREAM TOPICAL 2 TIMES DAILY
Qty: 15 G | Refills: 0 | Status: SHIPPED | OUTPATIENT
Start: 2018-06-04 | End: 2019-01-29

## 2018-06-04 NOTE — TELEPHONE ENCOUNTER
Patient states he scheduled an appointment for 6-6-18 related to rash to neck/itching. Patient requesting a prescription related to rash be call in to the pharmacy. Writer advised patient evaluation needed to ensure proper diagnosis and treatment. Offered appointment on today's date in [facility]. Patient accepted appointment on today and canceled appointment on 6-6-18.

## 2018-06-04 NOTE — PATIENT INSTRUCTIONS
Stop using your current razor. Get disposables and throw them away after each use until the rash is clear.

## 2018-06-04 NOTE — PROGRESS NOTES
Health Maintenance Due   Topic Date Due    TETANUS VACCINE  02/05/1979    Colonoscopy  02/05/2011

## 2018-06-25 ENCOUNTER — TELEPHONE (OUTPATIENT)
Dept: FAMILY MEDICINE | Facility: CLINIC | Age: 58
End: 2018-06-25

## 2018-06-25 DIAGNOSIS — M17.10 PRIMARY OSTEOARTHRITIS OF KNEE, UNSPECIFIED LATERALITY: Primary | ICD-10-CM

## 2018-06-25 DIAGNOSIS — M17.0 OSTEOARTHRITIS OF BOTH KNEES, UNSPECIFIED OSTEOARTHRITIS TYPE: Chronic | ICD-10-CM

## 2018-06-25 DIAGNOSIS — Z51.81 THERAPEUTIC DRUG MONITORING: ICD-10-CM

## 2018-06-25 NOTE — TELEPHONE ENCOUNTER
Please see attached message from patient. Patient is requesting a refill of Hydrocodone. LR--5-24-18. Last office visit noted is dated for 6-4-18 with Mrs. Nae Cook related to Folliculitis. Last office visit prior to the visit with Nae Cook is dated 2-26-18. Patient's last urine toxicology is dated 6-1-17 (out of date). Pain contract is dated 12-1-17.  Order was not pended. Unsure if this can be refilled without appointment related to medication check/updated urine toxicology. Please advise.

## 2018-06-25 NOTE — TELEPHONE ENCOUNTER
----- Message from RT Ashely sent at 6/25/2018  9:48 AM CDT -----  Contact: pt    pt , requesting medication refill: Hydrocodone, thanks.

## 2018-06-26 ENCOUNTER — OFFICE VISIT (OUTPATIENT)
Dept: DERMATOLOGY | Facility: CLINIC | Age: 58
End: 2018-06-26
Payer: MEDICARE

## 2018-06-26 VITALS — HEIGHT: 71 IN | WEIGHT: 208 LBS | BODY MASS INDEX: 29.12 KG/M2

## 2018-06-26 DIAGNOSIS — L23.4 ALLERGIC CONTACT DERMATITIS DUE TO DYES: ICD-10-CM

## 2018-06-26 DIAGNOSIS — L81.0 POST-INFLAMMATORY HYPERPIGMENTATION: Primary | ICD-10-CM

## 2018-06-26 PROCEDURE — 99201 PR OFFICE/OUTPT VISIT,NEW,LEVL I: CPT | Mod: S$PBB,,, | Performed by: DERMATOLOGY

## 2018-06-26 PROCEDURE — 99999 PR PBB SHADOW E&M-EST. PATIENT-LVL III: CPT | Mod: PBBFAC,,, | Performed by: DERMATOLOGY

## 2018-06-26 PROCEDURE — 99213 OFFICE O/P EST LOW 20 MIN: CPT | Mod: PBBFAC,PO | Performed by: DERMATOLOGY

## 2018-06-26 RX ORDER — TRIAMCINOLONE ACETONIDE 1 MG/G
CREAM TOPICAL
Qty: 60 G | Refills: 3 | Status: SHIPPED | OUTPATIENT
Start: 2018-06-26 | End: 2022-02-14

## 2018-06-26 NOTE — LETTER
June 26, 2018      No Recipients           [facility]  [address] [place] [address]  Phone: [phone]          Patient: Shamar Anne   MR Number: [id number]   YOB: 1960   Date of Visit: 6/26/2018       Dear :    Thank you for referring Shamar Anne to me for evaluation. Attached you will find relevant portions of my assessment and plan of care.    If you have questions, please do not hesitate to call me. I look forward to following Shamar Anne along with you.    Sincerely,    Vilma Rodríguez MD    Enclosure  CC:  No Recipients    If you would like to receive this communication electronically, please contact [email or web address] or [phone] to request more information on [employer] Link access.    For providers and/or their staff who would like to refer a patient to [facility], please contact us through our one-stop-shop provider referral line, [facility], at [phone].    If you feel you have received this communication in error or would no longer like to receive these types of communications, please e-mail [email or web address]

## 2018-06-26 NOTE — PROGRESS NOTES
Subjective:       Patient ID:  Shamar Anne is a 58 y.o. male who presents for   Chief Complaint   Patient presents with    Rash     neck     Initial visit  C/o rash, started 2 weeks ago, itchy, scratched  NP Long rxed lotrisone, noted marked improved  Dyed goatee just prior to onset        Rash  - Initial  Affected locations: neck  Duration: 1 month  Signs / symptoms: dryness and scaling  Severity: mild  Timing: constant  Aggravated by: nothing  Treatments tried: finished doxy, lotrisone cream, cortisone cream OTC.  Improvement on treatment: no relief        Review of Systems   Constitutional: Negative for fever, chills, weight loss, weight gain, fatigue, night sweats and malaise.   Skin: Positive for rash, dry skin, activity-related sunscreen use and wears hat. Negative for itching and daily sunscreen use.   Hematologic/Lymphatic: Does not bruise/bleed easily.        Objective:    Physical Exam   Constitutional: He appears well-developed and well-nourished. No distress.   Neurological: He is alert and oriented to person, place, and time. He is not disoriented.   Psychiatric: He has a normal mood and affect.   Skin:   Areas Examined (abnormalities noted in diagram):   Neck Inspection Performed              Diagram Legend     Erythematous scaling macule/papule c/w actinic keratosis       Vascular papule c/w angioma      Pigmented verrucoid papule/plaque c/w seborrheic keratosis      Yellow umbilicated papule c/w sebaceous hyperplasia      Irregularly shaped tan macule c/w lentigo     1-2 mm smooth white papules consistent with Milia      Movable subcutaneous cyst with punctum c/w epidermal inclusion cyst      Subcutaneous movable cyst c/w pilar cyst      Firm pink to brown papule c/w dermatofibroma      Pedunculated fleshy papule(s) c/w skin tag(s)      Evenly pigmented macule c/w junctional nevus     Mildly variegated pigmented, slightly irregular-bordered macule c/w mildly atypical nevus      Flesh colored  to evenly pigmented papule c/w intradermal nevus       Pink pearly papule/plaque c/w basal cell carcinoma      Erythematous hyperkeratotic cursted plaque c/w SCC      Surgical scar with no sign of skin cancer recurrence      Open and closed comedones      Inflammatory papules and pustules      Verrucoid papule consistent consistent with wart     Erythematous eczematous patches and plaques     Dystrophic onycholytic nail with subungual debris c/w onychomycosis     Umbilicated papule    Erythematous-base heme-crusted tan verrucoid plaque consistent with inflamed seborrheic keratosis     Erythematous Silvery Scaling Plaque c/w Psoriasis     See annotation      Assessment / Plan:        Post-inflammatory hyperpigmentation  Will need time to even out    Allergic contact dermatitis due to dyes  Must avoid dye, likely PPD    -     triamcinolone acetonide 0.1% (KENALOG) 0.1 % cream; AAA bid  Dispense: 60 g; Refill: 3             No Follow-up on file.

## 2018-06-29 ENCOUNTER — TELEPHONE (OUTPATIENT)
Dept: FAMILY MEDICINE | Facility: CLINIC | Age: 58
End: 2018-06-29

## 2018-06-29 ENCOUNTER — LAB VISIT (OUTPATIENT)
Dept: LAB | Facility: HOSPITAL | Age: 58
End: 2018-06-29
Attending: FAMILY MEDICINE
Payer: MEDICARE

## 2018-06-29 DIAGNOSIS — M17.10 PRIMARY OSTEOARTHRITIS OF KNEE, UNSPECIFIED LATERALITY: ICD-10-CM

## 2018-06-29 DIAGNOSIS — Z51.81 THERAPEUTIC DRUG MONITORING: ICD-10-CM

## 2018-06-29 PROCEDURE — 80307 DRUG TEST PRSMV CHEM ANLYZR: CPT

## 2018-06-29 RX ORDER — HYDROCODONE BITARTRATE AND ACETAMINOPHEN 7.5; 325 MG/1; MG/1
1 TABLET ORAL EVERY 8 HOURS PRN
Qty: 90 TABLET | Refills: 0 | Status: SHIPPED | OUTPATIENT
Start: 2018-06-29 | End: 2018-07-17 | Stop reason: SDDI

## 2018-06-29 NOTE — TELEPHONE ENCOUNTER
Order for urine toxicology placed by Dr. Magana with instructions to have lab performed prior to  of printed prescription. Call placed to patient for notification. No answer received. Left message requesting return call to office.

## 2018-06-29 NOTE — TELEPHONE ENCOUNTER
----- Message from Barrett Mack sent at 6/29/2018  9:22 AM CDT -----  Contact: Shamar  Calling on the status of Rx HYDROcodone-acetaminophen (NORCO) 7.5-325 mg per tablet 90 tablet   He says he has been out 4 days now.   [facility] [address] [place] [address] & [address] [address]  Phone: [phone] Fax: [phone]    Call [phone] (home)   thanks

## 2018-06-29 NOTE — TELEPHONE ENCOUNTER
Order for urine toxicology placed by Dr. Magana. Patient notified lab required and then prescription could be provided. Patient states he will have lab performed today. Follow up appointment scheduled for 9-4-18 related to 3 month refill requirement. Patient agreed to appointment date and time. Patient notified to request prescription at front check in desk upon completion of lab.

## 2018-07-05 LAB

## 2018-07-06 ENCOUNTER — TELEPHONE (OUTPATIENT)
Dept: FAMILY MEDICINE | Facility: CLINIC | Age: 58
End: 2018-07-06

## 2018-07-06 DIAGNOSIS — Z51.89 ENCOUNTER FOR PATIENT COMPLIANCE MONITORING IN DRUG TREATMENT PROGRAM: Primary | ICD-10-CM

## 2018-07-09 ENCOUNTER — LAB VISIT (OUTPATIENT)
Dept: LAB | Facility: HOSPITAL | Age: 58
End: 2018-07-09
Attending: FAMILY MEDICINE
Payer: MEDICARE

## 2018-07-09 DIAGNOSIS — Z51.89 ENCOUNTER FOR PATIENT COMPLIANCE MONITORING IN DRUG TREATMENT PROGRAM: ICD-10-CM

## 2018-07-09 PROCEDURE — 80361 OPIATES 1 OR MORE: CPT

## 2018-07-09 PROCEDURE — 36415 COLL VENOUS BLD VENIPUNCTURE: CPT | Mod: PO

## 2018-07-17 LAB
DIHYDROCODEINE FREE [MASS/VOLUME] IN SERUM OR PLASMA: <1 NG/ML
HYDROCODONE FREE [MASS/VOLUME] IN SERUM OR PLASMA: <1 NG/ML
HYDROMORPHONE FREE [MASS/VOLUME] IN SERUM OR PLASMA: <1 NG/ML

## 2018-08-29 DIAGNOSIS — Z12.11 COLON CANCER SCREENING: ICD-10-CM

## 2018-09-16 DIAGNOSIS — J30.9 CHRONIC ALLERGIC RHINITIS: ICD-10-CM

## 2018-09-17 RX ORDER — MONTELUKAST SODIUM 10 MG/1
TABLET ORAL
Qty: 30 TABLET | Refills: 0 | Status: SHIPPED | OUTPATIENT
Start: 2018-09-17 | End: 2018-10-25 | Stop reason: SDUPTHER

## 2018-10-09 ENCOUNTER — TELEPHONE (OUTPATIENT)
Dept: FAMILY MEDICINE | Facility: CLINIC | Age: 58
End: 2018-10-09

## 2018-10-09 NOTE — TELEPHONE ENCOUNTER
----- Message from Natalia Sutherland sent at 10/9/2018  9:17 AM CDT -----  Type: Needs Medical Advice    Who Called:  Patient  Best Call Back Number: [phone]  Additional Information: Patient requesting to speak with nurse concerning cancelled appointment in September/has question/please call back to advise.

## 2018-10-25 DIAGNOSIS — J30.9 CHRONIC ALLERGIC RHINITIS: ICD-10-CM

## 2018-10-26 RX ORDER — MONTELUKAST SODIUM 10 MG/1
TABLET ORAL
Qty: 90 TABLET | Refills: 0 | Status: SHIPPED | OUTPATIENT
Start: 2018-10-26 | End: 2019-05-21 | Stop reason: SDUPTHER

## 2019-01-14 ENCOUNTER — PATIENT OUTREACH (OUTPATIENT)
Dept: ADMINISTRATIVE | Facility: HOSPITAL | Age: 59
End: 2019-01-14

## 2019-01-14 NOTE — LETTER
"January 22, 2019    Shamar Anne  [address]  [place] [address]             [facility]  [address]  [facility] [address]  Phone: [phone] Dear Herbert [facility] is committed to your overall health and would like to ensure that you are up to date on your recommended test and/or procedures.   Juvenal Magana MD  has found that your chart shows you may be due for the following:     COLORECTAL CANCER SCREENING     If you have already had your screening, or you have made an appointment for your screening, congratulations!  You're on the road to good health. If you haven't signed up for a colorectal screening please accept this invitation to be screened.       According to the American Cancer Society, colon cancer is the third most common cancer for people in the [place].  A simple screening test "Fit Kit" - done in your own home - can help find colon cancer at an e mercedes stage when it can be treated, even before any signs or symptoms develop.     Testing for blood in your stool (feces or bowel movement) is the first step. If you have an upcoming visit with your Primary Care Physician you can  a Fit Kit during your visit or you can  a Fit Kit at your Primary Care Clinic prior to your visit.     The Fit Kit includes:     Instructions on how to perform the test   (1) Sheet of tissue paper   (1) Small Absorption pad   (1) Bottle to hold the sample and a small probe to help you take the sample   (1) Small plastic bio-hazard bag   (1) Postage-paid return envelope     Please do your test (the instructions will tell you how) and then return your sample in the postage-paid return envelope within 24 hours of collection.     If your test results are negative, you won't need testing again for another year.  If results show you need more testing, we will call you with next steps.     Regular colorectal cancer screening is one of the most powerful weapons for " "preventing colon cancer.  The website https://www.cancer.org/cancer/colon-rectal-cancer.html can answer many of your questions about this cancer and its prevention.  Just search for "colorectal cancer".     If you have any questions please call [facility] [phone] for assistance.   If you have had any of the above done at another facility, please let us know so that we may obtain copies from that facility.  If you have a copy of these records, please provide a copy for us to scan into your chart.  You are welcome to request that the report be faxed to us at  [phone]).     Otherwise, please schedule these appointments at your earliest convenience by calling [phone] or going to [email or web address].     If you have an upcoming scheduled appointment, please disregard this letter.     If you are treated at a [facility] facility for your Health Maintenance, a VA form for Release of Records needs to signed here at the clinic so we can get your records for your [facility] chart. If you have [employer] you can upload the records there by taking a picture of the record. You can also bring in the record so a copy can be made for your faustino or mail us a copy to [address] [place] [address], or fax the records to [phone].     Thank you for letting [facility] take care of your healthcare needs.       MD Keyonna Arnold LPN Clinical Care Coordinator   [facility]   [address] [place] [address]   Phone [phone]   Fax [phone]        "

## 2019-01-19 DIAGNOSIS — J30.9 CHRONIC ALLERGIC RHINITIS: ICD-10-CM

## 2019-01-21 RX ORDER — MONTELUKAST SODIUM 10 MG/1
TABLET ORAL
Qty: 90 TABLET | Refills: 0 | OUTPATIENT
Start: 2019-01-21

## 2019-01-29 ENCOUNTER — OFFICE VISIT (OUTPATIENT)
Dept: FAMILY MEDICINE | Facility: CLINIC | Age: 59
End: 2019-01-29
Payer: MEDICARE

## 2019-01-29 VITALS
DIASTOLIC BLOOD PRESSURE: 94 MMHG | HEIGHT: 71 IN | BODY MASS INDEX: 30.03 KG/M2 | TEMPERATURE: 98 F | WEIGHT: 214.5 LBS | HEART RATE: 73 BPM | SYSTOLIC BLOOD PRESSURE: 147 MMHG

## 2019-01-29 DIAGNOSIS — Z12.5 PROSTATE CANCER SCREENING: ICD-10-CM

## 2019-01-29 DIAGNOSIS — E78.2 MIXED DYSLIPIDEMIA: Chronic | ICD-10-CM

## 2019-01-29 DIAGNOSIS — I10 ESSENTIAL HYPERTENSION: Primary | Chronic | ICD-10-CM

## 2019-01-29 DIAGNOSIS — M17.0 PRIMARY OSTEOARTHRITIS OF BOTH KNEES: ICD-10-CM

## 2019-01-29 DIAGNOSIS — E78.5 DYSLIPIDEMIA: Chronic | ICD-10-CM

## 2019-01-29 DIAGNOSIS — Z23 NEED FOR ZOSTER VACCINE: ICD-10-CM

## 2019-01-29 DIAGNOSIS — N52.9 ERECTILE DYSFUNCTION, UNSPECIFIED ERECTILE DYSFUNCTION TYPE: ICD-10-CM

## 2019-01-29 PROCEDURE — 99999 PR PBB SHADOW E&M-EST. PATIENT-LVL III: ICD-10-PCS | Mod: PBBFAC,,, | Performed by: FAMILY MEDICINE

## 2019-01-29 PROCEDURE — 99214 OFFICE O/P EST MOD 30 MIN: CPT | Mod: S$GLB,,, | Performed by: FAMILY MEDICINE

## 2019-01-29 PROCEDURE — 3008F BODY MASS INDEX DOCD: CPT | Mod: S$GLB,,, | Performed by: FAMILY MEDICINE

## 2019-01-29 PROCEDURE — 99214 PR OFFICE/OUTPT VISIT, EST, LEVL IV, 30-39 MIN: ICD-10-PCS | Mod: S$GLB,,, | Performed by: FAMILY MEDICINE

## 2019-01-29 PROCEDURE — 3080F PR MOST RECENT DIASTOLIC BLOOD PRESSURE >= 90 MM HG: ICD-10-PCS | Mod: S$GLB,,, | Performed by: FAMILY MEDICINE

## 2019-01-29 PROCEDURE — 99999 PR PBB SHADOW E&M-EST. PATIENT-LVL III: CPT | Mod: PBBFAC,,, | Performed by: FAMILY MEDICINE

## 2019-01-29 PROCEDURE — 3008F PR BODY MASS INDEX (BMI) DOCUMENTED: ICD-10-PCS | Mod: S$GLB,,, | Performed by: FAMILY MEDICINE

## 2019-01-29 PROCEDURE — 3080F DIAST BP >= 90 MM HG: CPT | Mod: S$GLB,,, | Performed by: FAMILY MEDICINE

## 2019-01-29 PROCEDURE — 3077F PR MOST RECENT SYSTOLIC BLOOD PRESSURE >= 140 MM HG: ICD-10-PCS | Mod: S$GLB,,, | Performed by: FAMILY MEDICINE

## 2019-01-29 PROCEDURE — 3077F SYST BP >= 140 MM HG: CPT | Mod: S$GLB,,, | Performed by: FAMILY MEDICINE

## 2019-01-29 RX ORDER — SILDENAFIL CITRATE 20 MG/1
60 TABLET ORAL DAILY
Qty: 30 TABLET | Refills: 3 | Status: SHIPPED | OUTPATIENT
Start: 2019-01-29 | End: 2020-01-29

## 2019-01-29 RX ORDER — DICLOFENAC SODIUM 75 MG/1
75 TABLET, DELAYED RELEASE ORAL 2 TIMES DAILY
Qty: 60 TABLET | Refills: 1 | Status: SHIPPED | OUTPATIENT
Start: 2019-01-29 | End: 2019-03-22 | Stop reason: SDUPTHER

## 2019-01-29 NOTE — PATIENT INSTRUCTIONS

## 2019-02-10 NOTE — PROGRESS NOTES
Subjective:       Patient ID: Shamar Anne is a 59 y.o. male.    Chief Complaint: Hypertension and Hyperlipidemia  HT well controlled, no edema, no chest pain, denies dyspnea.He has chronic DDD under NSAid.  He has poor exercises capacity and ED.  HPI  Review of Systems   Constitutional: Negative for fatigue and unexpected weight change.   Respiratory: Negative for chest tightness and shortness of breath.    Cardiovascular: Negative for chest pain, palpitations and leg swelling.   Gastrointestinal: Negative for abdominal pain.   Musculoskeletal: Negative for arthralgias.   Neurological: Negative for dizziness, syncope, light-headedness and headaches.       Patient Active Problem List   Diagnosis    DJD (degenerative joint disease) of knee    ACL (anterior cruciate ligament) rupture    HTN (hypertension)    OA (osteoarthritis) of knee    Dyslipidemia    BMI 30.0-30.9,adult    Mixed dyslipidemia       Objective:      Physical Exam   Constitutional: He is oriented to person, place, and time. He appears well-developed and well-nourished.   Cardiovascular: Normal rate, regular rhythm and normal heart sounds.   Pulmonary/Chest: Effort normal and breath sounds normal.   Musculoskeletal: He exhibits no edema.        Cervical back: He exhibits decreased range of motion, tenderness and bony tenderness.        Lumbar back: He exhibits decreased range of motion, tenderness, bony tenderness and pain.   Neurological: He is alert and oriented to person, place, and time.   Skin: Skin is warm and dry.   Psychiatric: He has a normal mood and affect.   Nursing note and vitals reviewed.      Lab Results   Component Value Date    WBC 6.00 11/20/2017    HGB 15.4 11/20/2017    HCT 45.4 11/20/2017     11/20/2017    CHOL 186 05/29/2018    TRIG 142 05/29/2018    HDL 39 (L) 05/29/2018    ALT 22 05/29/2018    AST 21 05/29/2018     11/20/2017    K 3.9 11/20/2017     11/20/2017    CREATININE 0.9 11/20/2017    BUN  11 11/20/2017    CO2 27 11/20/2017    TSH 0.927 08/01/2014    PSA 2.9 05/29/2018     The 10-year ASCVD risk score (Daren NÚÑEZ Jr., et al., 2013) is: 16.7%    Values used to calculate the score:      Age: 59 years      Sex: Male      Is Non- : Yes      Diabetic: No      Tobacco smoker: No      Systolic Blood Pressure: 147 mmHg      Is BP treated: Yes      HDL Cholesterol: 39 mg/dL      Total Cholesterol: 186 mg/dL    Assessment:       1. Essential hypertension    2. Dyslipidemia    3. BMI 30.0-30.9,adult    4. Mixed dyslipidemia    5. Need for zoster vaccine    6. Erectile dysfunction, unspecified erectile dysfunction type    7. Primary osteoarthritis of both knees    8. Prostate cancer screening        Plan:       Essential hypertension  -     Lipid panel; Future; Expected date: 01/29/2019  -     Comprehensive metabolic panel; Future; Expected date: 01/29/2019  -     CBC auto differential; Future; Expected date: 01/29/2019    Dyslipidemia  -     Lipid panel; Future; Expected date: 01/29/2019  -     Comprehensive metabolic panel; Future; Expected date: 01/29/2019    BMI 30.0-30.9,adult    Mixed dyslipidemia    Need for zoster vaccine  -     varicella-zoster [id number], PF, (SHINGRIX, PF,) 50 mcg/0.5 mL injection; Inject 0.5 mLs into the muscle once. for 1 dose  Dispense: 0.5 mL; Refill: 1    Erectile dysfunction, unspecified erectile dysfunction type  -     sildenafil (REVATIO) 20 mg Tab; Take 3 tablets (60 mg total) by mouth once daily.  Dispense: 30 tablet; Refill: 3    Primary osteoarthritis of both knees  -     diclofenac (VOLTAREN) 75 MG EC tablet; Take 1 tablet (75 mg total) by mouth 2 (two) times daily.  Dispense: 60 tablet; Refill: 1    Prostate cancer screening  -     PSA, SCREENING; Future; Expected date: 01/29/2019      Patient readiness: acceptance and barriers:readiness    During the course of the visit the patient was educated and counseled about the following:     Hypertension:    Screening labs for initial evaluation: basic metabolic panel, C-reactive protein and lipid panel.    Goals: Hypertension: Reduce Blood Pressure    Did patient meet goals/outcomes: No    The following self management tools provided: blood pressure log  excercise log    Patient Instructions (the written plan) was given to the patient/family.     Time spent with patient: 30 minutes    Barriers to medications present (no )    Adverse reactions to current medications (no)    Over the counter medications reviewed (Yes)        30-minute visit. 20 minutes spent counseling patient on diet, exercise, and weight loss.  This has been fully explained to the patient, who indicates understanding.

## 2019-02-19 ENCOUNTER — TELEPHONE (OUTPATIENT)
Dept: FAMILY MEDICINE | Facility: CLINIC | Age: 59
End: 2019-02-19

## 2019-02-19 NOTE — TELEPHONE ENCOUNTER
----- Message from Neela Vargas sent at 2/19/2019 12:56 PM CST -----  Type: Needs Medical Advice    Who Called: Patient    Best Call Back Number: [phone] (home)     Additional Information: Patient called to reschedule his nurse visit

## 2019-02-19 NOTE — TELEPHONE ENCOUNTER
Nurse blood pressure check rescheduled from today's date (2/19/19) to the date of 2/27/19 per patient request.

## 2019-02-20 DIAGNOSIS — J30.1 CHRONIC SEASONAL ALLERGIC RHINITIS DUE TO POLLEN: ICD-10-CM

## 2019-02-20 RX ORDER — FLUTICASONE PROPIONATE 50 MCG
SPRAY, SUSPENSION (ML) NASAL
Qty: 16 ML | Refills: 0 | Status: SHIPPED | OUTPATIENT
Start: 2019-02-20 | End: 2019-04-19 | Stop reason: SDUPTHER

## 2019-03-22 DIAGNOSIS — M17.0 PRIMARY OSTEOARTHRITIS OF BOTH KNEES: ICD-10-CM

## 2019-03-22 RX ORDER — DICLOFENAC SODIUM 75 MG/1
TABLET, DELAYED RELEASE ORAL
Qty: 60 TABLET | Refills: 0 | Status: SHIPPED | OUTPATIENT
Start: 2019-03-22 | End: 2022-03-23 | Stop reason: ALTCHOICE

## 2019-04-19 DIAGNOSIS — J30.1 CHRONIC SEASONAL ALLERGIC RHINITIS DUE TO POLLEN: ICD-10-CM

## 2019-04-22 RX ORDER — FLUTICASONE PROPIONATE 50 MCG
SPRAY, SUSPENSION (ML) NASAL
Qty: 16 ML | Refills: 0 | Status: SHIPPED | OUTPATIENT
Start: 2019-04-22 | End: 2019-05-21 | Stop reason: SDUPTHER

## 2019-05-21 DIAGNOSIS — J30.9 CHRONIC ALLERGIC RHINITIS: ICD-10-CM

## 2019-05-21 DIAGNOSIS — J30.1 CHRONIC SEASONAL ALLERGIC RHINITIS DUE TO POLLEN: ICD-10-CM

## 2019-05-22 RX ORDER — MONTELUKAST SODIUM 10 MG/1
TABLET ORAL
Qty: 30 TABLET | Refills: 0 | Status: SHIPPED | OUTPATIENT
Start: 2019-05-22 | End: 2021-02-22

## 2019-05-22 RX ORDER — FLUTICASONE PROPIONATE 50 MCG
SPRAY, SUSPENSION (ML) NASAL
Qty: 16 ML | Refills: 0 | Status: SHIPPED | OUTPATIENT
Start: 2019-05-22 | End: 2019-07-16 | Stop reason: SDUPTHER

## 2019-05-28 ENCOUNTER — OFFICE VISIT (OUTPATIENT)
Dept: FAMILY MEDICINE | Facility: CLINIC | Age: 59
End: 2019-05-28
Payer: MEDICARE

## 2019-05-28 ENCOUNTER — LAB VISIT (OUTPATIENT)
Dept: LAB | Facility: HOSPITAL | Age: 59
End: 2019-05-28
Attending: FAMILY MEDICINE
Payer: MEDICARE

## 2019-05-28 VITALS
HEART RATE: 65 BPM | DIASTOLIC BLOOD PRESSURE: 80 MMHG | BODY MASS INDEX: 29.72 KG/M2 | TEMPERATURE: 98 F | HEIGHT: 71 IN | WEIGHT: 212.31 LBS | SYSTOLIC BLOOD PRESSURE: 124 MMHG | OXYGEN SATURATION: 97 %

## 2019-05-28 DIAGNOSIS — Z12.5 PROSTATE CANCER SCREENING: ICD-10-CM

## 2019-05-28 DIAGNOSIS — E78.5 DYSLIPIDEMIA: Chronic | ICD-10-CM

## 2019-05-28 DIAGNOSIS — M17.0 OSTEOARTHRITIS OF BOTH KNEES, UNSPECIFIED OSTEOARTHRITIS TYPE: ICD-10-CM

## 2019-05-28 DIAGNOSIS — I10 ESSENTIAL HYPERTENSION: Chronic | ICD-10-CM

## 2019-05-28 DIAGNOSIS — E78.2 MIXED DYSLIPIDEMIA: Chronic | ICD-10-CM

## 2019-05-28 DIAGNOSIS — I10 ESSENTIAL HYPERTENSION: ICD-10-CM

## 2019-05-28 DIAGNOSIS — J30.1 CHRONIC SEASONAL ALLERGIC RHINITIS DUE TO POLLEN: Primary | ICD-10-CM

## 2019-05-28 LAB
ALBUMIN SERPL BCP-MCNC: 4.2 G/DL (ref 3.5–5.2)
ALP SERPL-CCNC: 81 U/L (ref 55–135)
ALT SERPL W/O P-5'-P-CCNC: 21 U/L (ref 10–44)
ANION GAP SERPL CALC-SCNC: 10 MMOL/L (ref 8–16)
AST SERPL-CCNC: 19 U/L (ref 10–40)
BASOPHILS # BLD AUTO: 0.04 K/UL (ref 0–0.2)
BASOPHILS NFR BLD: 0.6 % (ref 0–1.9)
BILIRUB SERPL-MCNC: 0.8 MG/DL (ref 0.1–1)
BUN SERPL-MCNC: 10 MG/DL (ref 6–20)
CALCIUM SERPL-MCNC: 10.3 MG/DL (ref 8.7–10.5)
CHLORIDE SERPL-SCNC: 105 MMOL/L (ref 95–110)
CHOLEST SERPL-MCNC: 188 MG/DL (ref 120–199)
CHOLEST/HDLC SERPL: 4.7 {RATIO} (ref 2–5)
CO2 SERPL-SCNC: 25 MMOL/L (ref 23–29)
COMPLEXED PSA SERPL-MCNC: 2.3 NG/ML (ref 0–4)
CREAT SERPL-MCNC: 0.9 MG/DL (ref 0.5–1.4)
DIFFERENTIAL METHOD: NORMAL
EOSINOPHIL # BLD AUTO: 0.1 K/UL (ref 0–0.5)
EOSINOPHIL NFR BLD: 1.1 % (ref 0–8)
ERYTHROCYTE [DISTWIDTH] IN BLOOD BY AUTOMATED COUNT: 13.5 % (ref 11.5–14.5)
EST. GFR  (AFRICAN AMERICAN): >60 ML/MIN/1.73 M^2
EST. GFR  (NON AFRICAN AMERICAN): >60 ML/MIN/1.73 M^2
GLUCOSE SERPL-MCNC: 102 MG/DL (ref 70–110)
HCT VFR BLD AUTO: 48.8 % (ref 40–54)
HDLC SERPL-MCNC: 40 MG/DL (ref 40–75)
HDLC SERPL: 21.3 % (ref 20–50)
HGB BLD-MCNC: 15.9 G/DL (ref 14–18)
IMM GRANULOCYTES # BLD AUTO: 0.01 K/UL (ref 0–0.04)
IMM GRANULOCYTES NFR BLD AUTO: 0.1 % (ref 0–0.5)
LDLC SERPL CALC-MCNC: 111.2 MG/DL (ref 63–159)
LYMPHOCYTES # BLD AUTO: 2.8 K/UL (ref 1–4.8)
LYMPHOCYTES NFR BLD: 39.8 % (ref 18–48)
MCH RBC QN AUTO: 27.7 PG (ref 27–31)
MCHC RBC AUTO-ENTMCNC: 32.6 G/DL (ref 32–36)
MCV RBC AUTO: 85 FL (ref 82–98)
MONOCYTES # BLD AUTO: 0.5 K/UL (ref 0.3–1)
MONOCYTES NFR BLD: 7.6 % (ref 4–15)
NEUTROPHILS # BLD AUTO: 3.6 K/UL (ref 1.8–7.7)
NEUTROPHILS NFR BLD: 50.8 % (ref 38–73)
NONHDLC SERPL-MCNC: 148 MG/DL
NRBC BLD-RTO: 0 /100 WBC
PLATELET # BLD AUTO: 204 K/UL (ref 150–350)
PMV BLD AUTO: 11.3 FL (ref 9.2–12.9)
POTASSIUM SERPL-SCNC: 3.7 MMOL/L (ref 3.5–5.1)
PROT SERPL-MCNC: 7.6 G/DL (ref 6–8.4)
RBC # BLD AUTO: 5.73 M/UL (ref 4.6–6.2)
SODIUM SERPL-SCNC: 140 MMOL/L (ref 136–145)
TRIGL SERPL-MCNC: 184 MG/DL (ref 30–150)
WBC # BLD AUTO: 7.06 K/UL (ref 3.9–12.7)

## 2019-05-28 PROCEDURE — 3008F PR BODY MASS INDEX (BMI) DOCUMENTED: ICD-10-PCS | Mod: S$GLB,,, | Performed by: NURSE PRACTITIONER

## 2019-05-28 PROCEDURE — 99214 PR OFFICE/OUTPT VISIT, EST, LEVL IV, 30-39 MIN: ICD-10-PCS | Mod: S$GLB,,, | Performed by: NURSE PRACTITIONER

## 2019-05-28 PROCEDURE — 99214 OFFICE O/P EST MOD 30 MIN: CPT | Mod: S$GLB,,, | Performed by: NURSE PRACTITIONER

## 2019-05-28 PROCEDURE — 3008F BODY MASS INDEX DOCD: CPT | Mod: S$GLB,,, | Performed by: NURSE PRACTITIONER

## 2019-05-28 PROCEDURE — 36415 COLL VENOUS BLD VENIPUNCTURE: CPT | Mod: PO

## 2019-05-28 PROCEDURE — 3074F SYST BP LT 130 MM HG: CPT | Mod: S$GLB,,, | Performed by: NURSE PRACTITIONER

## 2019-05-28 PROCEDURE — 80061 LIPID PANEL: CPT

## 2019-05-28 PROCEDURE — 99999 PR PBB SHADOW E&M-EST. PATIENT-LVL IV: CPT | Mod: PBBFAC,,, | Performed by: NURSE PRACTITIONER

## 2019-05-28 PROCEDURE — 80053 COMPREHEN METABOLIC PANEL: CPT

## 2019-05-28 PROCEDURE — 84153 ASSAY OF PSA TOTAL: CPT

## 2019-05-28 PROCEDURE — 3079F DIAST BP 80-89 MM HG: CPT | Mod: S$GLB,,, | Performed by: NURSE PRACTITIONER

## 2019-05-28 PROCEDURE — 3074F PR MOST RECENT SYSTOLIC BLOOD PRESSURE < 130 MM HG: ICD-10-PCS | Mod: S$GLB,,, | Performed by: NURSE PRACTITIONER

## 2019-05-28 PROCEDURE — 3079F PR MOST RECENT DIASTOLIC BLOOD PRESSURE 80-89 MM HG: ICD-10-PCS | Mod: S$GLB,,, | Performed by: NURSE PRACTITIONER

## 2019-05-28 PROCEDURE — 85025 COMPLETE CBC W/AUTO DIFF WBC: CPT

## 2019-05-28 PROCEDURE — 99999 PR PBB SHADOW E&M-EST. PATIENT-LVL IV: ICD-10-PCS | Mod: PBBFAC,,, | Performed by: NURSE PRACTITIONER

## 2019-05-28 RX ORDER — LEVOCETIRIZINE DIHYDROCHLORIDE 5 MG/1
5 TABLET, FILM COATED ORAL NIGHTLY
Qty: 30 TABLET | Refills: 11 | Status: SHIPPED | OUTPATIENT
Start: 2019-05-28 | End: 2020-06-18 | Stop reason: SDUPTHER

## 2019-05-28 NOTE — PROGRESS NOTES
Subjective:       Patient ID: Shamar Anne is a 59 y.o. male.    Chief Complaint: follow up hypertension, dyslipidemia    Hypertension   This is a chronic problem. The current episode started more than 1 year ago. The problem has been gradually improving since onset. The problem is controlled. Pertinent negatives include no blurred vision, chest pain, headaches, palpitations, peripheral edema or shortness of breath. There are no associated agents to hypertension. Past treatments include calcium channel blockers. There are no compliance problems.  There is no history of angina, kidney disease or retinopathy.       Past Medical History:   Diagnosis Date    ACL (anterior cruciate ligament) rupture     both knees    Degenerative disc disease     Depression     DJD (degenerative joint disease) of knee     both Knees both    GERD (gastroesophageal reflux disease)     Hypertension        Review of patient's allergies indicates:   Allergen Reactions    Diovan [valsartan]          Current Outpatient Medications:     amlodipine (NORVASC) 10 MG tablet, Take 1 tablet (10 mg total) by mouth once daily., Disp: 90 tablet, Rfl: 3    diclofenac (VOLTAREN) 75 MG EC tablet, TAKE 1 TABLET(75 MG) BY MOUTH TWICE DAILY, Disp: 60 tablet, Rfl: 0    fluticasone propionate (FLONASE) 50 mcg/actuation nasal spray, SHAKE LIQUID AND USE 1 SPRAY IN EACH NOSTRIL DAILY, Disp: 16 mL, Rfl: 0    montelukast (SINGULAIR) 10 mg tablet, TAKE 1 TABLET(10 MG) BY MOUTH EVERY EVENING, Disp: 30 tablet, Rfl: 0    psyllium (METAMUCIL) 0.52 gram capsule, Take 4 capsules (2.08 g total) by mouth once daily., Disp: 120 capsule, Rfl: 11    sildenafil (REVATIO) 20 mg Tab, Take 3 tablets (60 mg total) by mouth once daily., Disp: 30 tablet, Rfl: 3    triamcinolone acetonide 0.1% (KENALOG) 0.1 % cream, AAA bid, Disp: 60 g, Rfl: 3    levocetirizine (XYZAL) 5 MG tablet, Take 1 tablet (5 mg total) by mouth every evening., Disp: 30 tablet, Rfl: 11     "pantoprazole (PROTONIX) 40 MG tablet, Take 1 tablet (40 mg total) by mouth once daily., Disp: 90 tablet, Rfl: 1    Review of Systems   Constitutional: Negative for unexpected weight change.   HENT: Negative for trouble swallowing.    Eyes: Negative for blurred vision and visual disturbance.   Respiratory: Negative for shortness of breath.    Cardiovascular: Negative for chest pain, palpitations and leg swelling.   Gastrointestinal: Negative for blood in stool.   Genitourinary: Negative for hematuria.   Skin: Negative for rash.   Allergic/Immunologic: Negative for immunocompromised state.   Neurological: Negative for headaches.   Hematological: Does not bruise/bleed easily.   Psychiatric/Behavioral: Negative for agitation. The patient is not nervous/anxious.        Objective:      /80 (BP Location: Right arm, Patient Position: Sitting, BP Method: Large (Manual))   Pulse 65   Temp 98 °F (36.7 °C) (Oral)   Ht 5' 11" (1.803 m)   Wt 96.3 kg (212 lb 4.9 oz)   SpO2 97%   BMI 29.61 kg/m²   Physical Exam   Constitutional: He is oriented to person, place, and time. He appears well-developed and well-nourished.   Eyes: Pupils are equal, round, and reactive to light. Conjunctivae and EOM are normal.   Neck: Normal range of motion. Neck supple.   Cardiovascular: Normal rate, regular rhythm, normal heart sounds and intact distal pulses.   Pulmonary/Chest: Effort normal and breath sounds normal.   Abdominal: Soft. Bowel sounds are normal.   Musculoskeletal: Normal range of motion.   Neurological: He is alert and oriented to person, place, and time.   Skin: Skin is warm and dry.   Psychiatric: He has a normal mood and affect. His behavior is normal. Judgment and thought content normal.       Assessment:       1. Chronic seasonal allergic rhinitis due to pollen    2. Essential hypertension    3. Mixed dyslipidemia    4. Osteoarthritis of both knees, unspecified osteoarthritis type    5. BMI 30.0-30.9,adult        Plan: " "      Chronic seasonal allergic rhinitis due to pollen  -     levocetirizine (XYZAL) 5 MG tablet; Take 1 tablet (5 mg total) by mouth every evening.  Dispense: 30 tablet; Refill: 11    Essential hypertension  Good reading to day  Low sodium diet  BP Readings from Last 3 Encounters:   05/28/19 124/80   01/29/19 (!) 147/94   06/04/18 124/74     Mixed dyslipidemia  Started patient on Crestor 10 mg  Low cholesterol, low fat diet  The 10-year ASCVD risk score (Daren NÚÑEZ Jr., et al., 2013) is: 12.3%    Values used to calculate the score:      Age: 59 years      Sex: Male      Is Non- : Yes      Diabetic: No      Tobacco smoker: No      Systolic Blood Pressure: 124 mmHg      Is BP treated: Yes      HDL Cholesterol: 40 mg/dL      Total Cholesterol: 188 mg/dL  Osteoarthritis of both knees, unspecified osteoarthritis type  Controlled, continue diclofenac (VOLTAREN) 75 MG EC tablet    BMI 30.0-30.9,adult  Counseled patient on his ideal body weight, health consequences of being obese and current recommendations including weekly exercise and a heart healthy diet.  Current BMI is:Estimated body mass index is 29.61 kg/m² as calculated from the following:    Height as of this encounter: 5' 11" (1.803 m).    Weight as of this encounter: 96.3 kg (212 lb 4.9 oz)..  Patient is aware that ideal BMI < 25 or Weight in (lb) to have BMI = 25: 178.9.            Patient readiness: acceptance and barriers:none    During the course of the visit the patient was educated and counseled about the following:     Hypertension:   Dietary sodium restriction.  Regular aerobic exercise.  Obesity:   General weight loss/lifestyle modification strategies discussed (elicit support from others; identify saboteurs; non-food rewards, etc).  Regular aerobic exercise program discussed.    Goals: Hypertension: Reduce Blood Pressure and Obesity: Reduce calorie intake and BMI    Did patient meet goals/outcomes: No    The following self " management tools provided: declined    Patient Instructions (the written plan) was given to the patient/family.     Time spent with patient: 30 minutes    Barriers to medications present (no )    Adverse reactions to current medications (no)    Over the counter medications reviewed (Yes)

## 2019-05-31 ENCOUNTER — TELEPHONE (OUTPATIENT)
Dept: FAMILY MEDICINE | Facility: CLINIC | Age: 59
End: 2019-05-31

## 2019-05-31 DIAGNOSIS — Z86.79 HISTORY OF ASCVD: ICD-10-CM

## 2019-05-31 DIAGNOSIS — E78.5 DYSLIPIDEMIA: Primary | ICD-10-CM

## 2019-05-31 RX ORDER — ROSUVASTATIN CALCIUM 10 MG/1
10 TABLET, COATED ORAL DAILY
Qty: 90 TABLET | Refills: 3 | Status: SHIPPED | OUTPATIENT
Start: 2019-05-31 | End: 2021-03-12

## 2019-05-31 NOTE — TELEPHONE ENCOUNTER
Please call patient to inform him that I was reviewing his chart and risk for heart diease. The risk for heart disease is elevated. I want to start him on a statin to reduce his risk for heart disease.please  at pharmacy.

## 2019-06-25 DIAGNOSIS — J30.9 CHRONIC ALLERGIC RHINITIS: ICD-10-CM

## 2019-06-26 RX ORDER — MONTELUKAST SODIUM 10 MG/1
TABLET ORAL
Qty: 30 TABLET | Refills: 0 | OUTPATIENT
Start: 2019-06-26

## 2019-07-16 DIAGNOSIS — J30.1 CHRONIC SEASONAL ALLERGIC RHINITIS DUE TO POLLEN: ICD-10-CM

## 2019-07-16 RX ORDER — FLUTICASONE PROPIONATE 50 MCG
SPRAY, SUSPENSION (ML) NASAL
Qty: 16 ML | Refills: 0 | Status: SHIPPED | OUTPATIENT
Start: 2019-07-16 | End: 2019-10-28 | Stop reason: SDUPTHER

## 2019-08-30 DIAGNOSIS — Z12.11 COLON CANCER SCREENING: ICD-10-CM

## 2019-10-28 DIAGNOSIS — J30.1 CHRONIC SEASONAL ALLERGIC RHINITIS DUE TO POLLEN: ICD-10-CM

## 2019-10-28 RX ORDER — FLUTICASONE PROPIONATE 50 MCG
SPRAY, SUSPENSION (ML) NASAL
Qty: 16 ML | Refills: 0 | Status: SHIPPED | OUTPATIENT
Start: 2019-10-28

## 2019-11-20 ENCOUNTER — PATIENT OUTREACH (OUTPATIENT)
Dept: ADMINISTRATIVE | Facility: HOSPITAL | Age: 59
End: 2019-11-20

## 2020-05-05 ENCOUNTER — PATIENT MESSAGE (OUTPATIENT)
Dept: ADMINISTRATIVE | Facility: HOSPITAL | Age: 60
End: 2020-05-05

## 2020-06-18 DIAGNOSIS — J30.1 CHRONIC SEASONAL ALLERGIC RHINITIS DUE TO POLLEN: ICD-10-CM

## 2020-06-19 RX ORDER — LEVOCETIRIZINE DIHYDROCHLORIDE 5 MG/1
5 TABLET, FILM COATED ORAL NIGHTLY
Qty: 30 TABLET | Refills: 11 | Status: SHIPPED | OUTPATIENT
Start: 2020-06-19 | End: 2021-06-19

## 2020-09-04 DIAGNOSIS — Z12.11 COLON CANCER SCREENING: ICD-10-CM

## 2020-09-22 ENCOUNTER — PATIENT OUTREACH (OUTPATIENT)
Dept: ADMINISTRATIVE | Facility: HOSPITAL | Age: 60
End: 2020-09-22

## 2020-10-05 ENCOUNTER — PATIENT MESSAGE (OUTPATIENT)
Dept: ADMINISTRATIVE | Facility: HOSPITAL | Age: 60
End: 2020-10-05

## 2020-10-30 ENCOUNTER — PATIENT MESSAGE (OUTPATIENT)
Dept: ADMINISTRATIVE | Facility: HOSPITAL | Age: 60
End: 2020-10-30

## 2021-01-04 ENCOUNTER — PATIENT MESSAGE (OUTPATIENT)
Dept: ADMINISTRATIVE | Facility: HOSPITAL | Age: 61
End: 2021-01-04

## 2021-02-22 ENCOUNTER — OFFICE VISIT (OUTPATIENT)
Dept: ORTHOPEDICS | Facility: CLINIC | Age: 61
End: 2021-02-22
Payer: MEDICARE

## 2021-02-22 VITALS
BODY MASS INDEX: 28 KG/M2 | SYSTOLIC BLOOD PRESSURE: 124 MMHG | HEIGHT: 71 IN | HEART RATE: 80 BPM | WEIGHT: 200 LBS | DIASTOLIC BLOOD PRESSURE: 80 MMHG

## 2021-02-22 DIAGNOSIS — M51.37 DISC DEGENERATION, LUMBOSACRAL: ICD-10-CM

## 2021-02-22 DIAGNOSIS — M50.30 OTHER CERVICAL DISC DEGENERATION, UNSPECIFIED CERVICAL REGION: Primary | ICD-10-CM

## 2021-02-22 DIAGNOSIS — M17.0 PRIMARY OSTEOARTHRITIS OF BOTH KNEES: ICD-10-CM

## 2021-02-22 PROCEDURE — 99203 OFFICE O/P NEW LOW 30 MIN: CPT | Mod: S$GLB,,, | Performed by: ORTHOPAEDIC SURGERY

## 2021-02-22 PROCEDURE — 1125F PR PAIN SEVERITY QUANTIFIED, PAIN PRESENT: ICD-10-PCS | Mod: S$GLB,,, | Performed by: ORTHOPAEDIC SURGERY

## 2021-02-22 PROCEDURE — 3074F PR MOST RECENT SYSTOLIC BLOOD PRESSURE < 130 MM HG: ICD-10-PCS | Mod: S$GLB,,, | Performed by: ORTHOPAEDIC SURGERY

## 2021-02-22 PROCEDURE — 3074F SYST BP LT 130 MM HG: CPT | Mod: S$GLB,,, | Performed by: ORTHOPAEDIC SURGERY

## 2021-02-22 PROCEDURE — 1125F AMNT PAIN NOTED PAIN PRSNT: CPT | Mod: S$GLB,,, | Performed by: ORTHOPAEDIC SURGERY

## 2021-02-22 PROCEDURE — 3008F PR BODY MASS INDEX (BMI) DOCUMENTED: ICD-10-PCS | Mod: S$GLB,,, | Performed by: ORTHOPAEDIC SURGERY

## 2021-02-22 PROCEDURE — 3079F DIAST BP 80-89 MM HG: CPT | Mod: S$GLB,,, | Performed by: ORTHOPAEDIC SURGERY

## 2021-02-22 PROCEDURE — 99203 PR OFFICE/OUTPT VISIT, NEW, LEVL III, 30-44 MIN: ICD-10-PCS | Mod: S$GLB,,, | Performed by: ORTHOPAEDIC SURGERY

## 2021-02-22 PROCEDURE — 3008F BODY MASS INDEX DOCD: CPT | Mod: S$GLB,,, | Performed by: ORTHOPAEDIC SURGERY

## 2021-02-22 PROCEDURE — 3079F PR MOST RECENT DIASTOLIC BLOOD PRESSURE 80-89 MM HG: ICD-10-PCS | Mod: S$GLB,,, | Performed by: ORTHOPAEDIC SURGERY

## 2021-02-22 RX ORDER — OXYCODONE AND ACETAMINOPHEN 10; 325 MG/1; MG/1
1 TABLET ORAL EVERY 6 HOURS PRN
COMMUNITY
Start: 2021-02-08

## 2021-03-10 ENCOUNTER — HOSPITAL ENCOUNTER (OUTPATIENT)
Dept: RADIOLOGY | Facility: HOSPITAL | Age: 61
Discharge: HOME | End: 2021-03-10
Attending: ORTHOPAEDIC SURGERY
Payer: MEDICARE

## 2021-03-10 DIAGNOSIS — M50.30 OTHER CERVICAL DISC DEGENERATION, UNSPECIFIED CERVICAL REGION: ICD-10-CM

## 2021-03-10 DIAGNOSIS — M51.37 DISC DEGENERATION, LUMBOSACRAL: ICD-10-CM

## 2021-03-10 PROCEDURE — 72148 MRI LUMBAR SPINE W/O DYE: CPT | Mod: TC,PO

## 2021-03-10 PROCEDURE — 72141 MRI NECK SPINE W/O DYE: CPT | Mod: TC,PO

## 2021-03-12 ENCOUNTER — OFFICE VISIT (OUTPATIENT)
Dept: ORTHOPEDICS | Facility: CLINIC | Age: 61
End: 2021-03-12
Payer: MEDICARE

## 2021-03-12 VITALS
SYSTOLIC BLOOD PRESSURE: 130 MMHG | WEIGHT: 205 LBS | BODY MASS INDEX: 28.7 KG/M2 | HEART RATE: 74 BPM | DIASTOLIC BLOOD PRESSURE: 61 MMHG | HEIGHT: 71 IN

## 2021-03-12 DIAGNOSIS — M48.02 CERVICAL SPINAL STENOSIS: ICD-10-CM

## 2021-03-12 DIAGNOSIS — M47.816 LUMBAR SPONDYLOSIS: ICD-10-CM

## 2021-03-12 DIAGNOSIS — M40.50: ICD-10-CM

## 2021-03-12 DIAGNOSIS — M47.816 LUMBAR FACET ARTHROPATHY: Primary | ICD-10-CM

## 2021-03-12 DIAGNOSIS — M50.30 DEGENERATIVE DISC DISEASE, CERVICAL: ICD-10-CM

## 2021-03-12 DIAGNOSIS — M47.812 FACET ARTHRITIS OF CERVICAL REGION: ICD-10-CM

## 2021-03-12 DIAGNOSIS — M51.36 LUMBAR DEGENERATIVE DISC DISEASE: ICD-10-CM

## 2021-03-12 PROCEDURE — 3078F PR MOST RECENT DIASTOLIC BLOOD PRESSURE < 80 MM HG: ICD-10-PCS | Mod: S$GLB,,, | Performed by: PHYSICIAN ASSISTANT

## 2021-03-12 PROCEDURE — 1125F PR PAIN SEVERITY QUANTIFIED, PAIN PRESENT: ICD-10-PCS | Mod: S$GLB,,, | Performed by: PHYSICIAN ASSISTANT

## 2021-03-12 PROCEDURE — 3008F PR BODY MASS INDEX (BMI) DOCUMENTED: ICD-10-PCS | Mod: S$GLB,,, | Performed by: PHYSICIAN ASSISTANT

## 2021-03-12 PROCEDURE — 99214 PR OFFICE/OUTPT VISIT, EST, LEVL IV, 30-39 MIN: ICD-10-PCS | Mod: S$GLB,,, | Performed by: PHYSICIAN ASSISTANT

## 2021-03-12 PROCEDURE — 3078F DIAST BP <80 MM HG: CPT | Mod: S$GLB,,, | Performed by: PHYSICIAN ASSISTANT

## 2021-03-12 PROCEDURE — 3075F PR MOST RECENT SYSTOLIC BLOOD PRESS GE 130-139MM HG: ICD-10-PCS | Mod: S$GLB,,, | Performed by: PHYSICIAN ASSISTANT

## 2021-03-12 PROCEDURE — 3075F SYST BP GE 130 - 139MM HG: CPT | Mod: S$GLB,,, | Performed by: PHYSICIAN ASSISTANT

## 2021-03-12 PROCEDURE — 3008F BODY MASS INDEX DOCD: CPT | Mod: S$GLB,,, | Performed by: PHYSICIAN ASSISTANT

## 2021-03-12 PROCEDURE — 1125F AMNT PAIN NOTED PAIN PRSNT: CPT | Mod: S$GLB,,, | Performed by: PHYSICIAN ASSISTANT

## 2021-03-12 PROCEDURE — 99214 OFFICE O/P EST MOD 30 MIN: CPT | Mod: S$GLB,,, | Performed by: PHYSICIAN ASSISTANT

## 2021-03-12 RX ORDER — SILDENAFIL 100 MG/1
TABLET, FILM COATED ORAL
COMMUNITY
Start: 2021-01-28

## 2021-04-05 ENCOUNTER — PATIENT MESSAGE (OUTPATIENT)
Dept: ADMINISTRATIVE | Facility: HOSPITAL | Age: 61
End: 2021-04-05

## 2021-04-14 ENCOUNTER — OFFICE VISIT (OUTPATIENT)
Dept: ORTHOPEDICS | Facility: CLINIC | Age: 61
End: 2021-04-14
Payer: MEDICARE

## 2021-04-14 VITALS
BODY MASS INDEX: 28.7 KG/M2 | SYSTOLIC BLOOD PRESSURE: 120 MMHG | HEART RATE: 78 BPM | WEIGHT: 205 LBS | HEIGHT: 71 IN | DIASTOLIC BLOOD PRESSURE: 92 MMHG

## 2021-04-14 DIAGNOSIS — M47.816 LUMBAR SPONDYLOSIS: ICD-10-CM

## 2021-04-14 DIAGNOSIS — M17.0 PRIMARY OSTEOARTHRITIS OF BOTH KNEES: ICD-10-CM

## 2021-04-14 DIAGNOSIS — M51.36 LUMBAR DEGENERATIVE DISC DISEASE: ICD-10-CM

## 2021-04-14 DIAGNOSIS — M48.02 CERVICAL SPINAL STENOSIS: ICD-10-CM

## 2021-04-14 DIAGNOSIS — M47.816 LUMBAR FACET ARTHROPATHY: Primary | ICD-10-CM

## 2021-04-14 DIAGNOSIS — M50.30 DEGENERATIVE DISC DISEASE, CERVICAL: ICD-10-CM

## 2021-04-14 PROCEDURE — 99213 OFFICE O/P EST LOW 20 MIN: CPT | Mod: S$GLB,,, | Performed by: ORTHOPAEDIC SURGERY

## 2021-04-14 PROCEDURE — 3008F PR BODY MASS INDEX (BMI) DOCUMENTED: ICD-10-PCS | Mod: S$GLB,,, | Performed by: ORTHOPAEDIC SURGERY

## 2021-04-14 PROCEDURE — 1125F AMNT PAIN NOTED PAIN PRSNT: CPT | Mod: S$GLB,,, | Performed by: ORTHOPAEDIC SURGERY

## 2021-04-14 PROCEDURE — 3008F BODY MASS INDEX DOCD: CPT | Mod: S$GLB,,, | Performed by: ORTHOPAEDIC SURGERY

## 2021-04-14 PROCEDURE — 1125F PR PAIN SEVERITY QUANTIFIED, PAIN PRESENT: ICD-10-PCS | Mod: S$GLB,,, | Performed by: ORTHOPAEDIC SURGERY

## 2021-04-14 PROCEDURE — 99213 PR OFFICE/OUTPT VISIT, EST, LEVL III, 20-29 MIN: ICD-10-PCS | Mod: S$GLB,,, | Performed by: ORTHOPAEDIC SURGERY

## 2021-04-14 RX ORDER — METRONIDAZOLE 500 MG/1
500 TABLET ORAL 2 TIMES DAILY
COMMUNITY
Start: 2021-03-22 | End: 2022-02-14

## 2021-07-06 ENCOUNTER — PATIENT MESSAGE (OUTPATIENT)
Dept: ADMINISTRATIVE | Facility: HOSPITAL | Age: 61
End: 2021-07-06

## 2021-07-07 ENCOUNTER — PATIENT MESSAGE (OUTPATIENT)
Dept: ADMINISTRATIVE | Facility: HOSPITAL | Age: 61
End: 2021-07-07

## 2021-10-07 ENCOUNTER — PATIENT MESSAGE (OUTPATIENT)
Dept: ADMINISTRATIVE | Facility: HOSPITAL | Age: 61
End: 2021-10-07

## 2021-11-16 ENCOUNTER — IMMUNIZATION (OUTPATIENT)
Dept: PRIMARY CARE CLINIC | Facility: CLINIC | Age: 61
End: 2021-11-16
Payer: MEDICARE

## 2021-11-16 DIAGNOSIS — Z23 NEED FOR VACCINATION: Primary | ICD-10-CM

## 2021-11-16 PROCEDURE — 91300 COVID-19, MRNA, LNP-S, PF, 30 MCG/0.3 ML DOSE VACCINE: CPT | Mod: S$GLB,,, | Performed by: FAMILY MEDICINE

## 2021-11-16 PROCEDURE — 0004A COVID-19, MRNA, LNP-S, PF, 30 MCG/0.3 ML DOSE VACCINE: CPT | Mod: S$GLB,,, | Performed by: FAMILY MEDICINE

## 2021-11-16 PROCEDURE — 0004A COVID-19, MRNA, LNP-S, PF, 30 MCG/0.3 ML DOSE VACCINE: ICD-10-PCS | Mod: S$GLB,,, | Performed by: FAMILY MEDICINE

## 2021-11-16 PROCEDURE — 91300 COVID-19, MRNA, LNP-S, PF, 30 MCG/0.3 ML DOSE VACCINE: ICD-10-PCS | Mod: S$GLB,,, | Performed by: FAMILY MEDICINE

## 2022-01-17 ENCOUNTER — HOSPITAL ENCOUNTER (EMERGENCY)
Facility: HOSPITAL | Age: 62
Discharge: HOME | End: 2022-01-17
Attending: EMERGENCY MEDICINE
Payer: MEDICARE

## 2022-01-17 VITALS
DIASTOLIC BLOOD PRESSURE: 94 MMHG | BODY MASS INDEX: 30.24 KG/M2 | OXYGEN SATURATION: 97 % | HEART RATE: 62 BPM | SYSTOLIC BLOOD PRESSURE: 166 MMHG | TEMPERATURE: 98 F | WEIGHT: 216 LBS | HEIGHT: 71 IN | RESPIRATION RATE: 14 BRPM

## 2022-01-17 DIAGNOSIS — I10 HTN (HYPERTENSION): Primary | ICD-10-CM

## 2022-01-17 LAB
ALBUMIN SERPL BCP-MCNC: 4.8 G/DL (ref 3.5–5.2)
ALP SERPL-CCNC: 72 U/L (ref 55–135)
ALT SERPL W/O P-5'-P-CCNC: 19 U/L (ref 10–44)
ANION GAP SERPL CALC-SCNC: 12 MMOL/L (ref 8–16)
AST SERPL-CCNC: 19 U/L (ref 10–40)
BASOPHILS # BLD AUTO: 0.05 K/UL (ref 0–0.2)
BASOPHILS NFR BLD: 0.3 % (ref 0–1.9)
BILIRUB SERPL-MCNC: 1.2 MG/DL (ref 0.1–1)
BUN SERPL-MCNC: 10 MG/DL (ref 6–20)
CALCIUM SERPL-MCNC: 9.6 MG/DL (ref 8.7–10.5)
CHLORIDE SERPL-SCNC: 104 MMOL/L (ref 95–110)
CO2 SERPL-SCNC: 23 MMOL/L (ref 23–29)
CREAT SERPL-MCNC: 0.8 MG/DL (ref 0.5–1.4)
DIFFERENTIAL METHOD: ABNORMAL
EOSINOPHIL # BLD AUTO: 0 K/UL (ref 0–0.5)
EOSINOPHIL NFR BLD: 0.3 % (ref 0–8)
ERYTHROCYTE [DISTWIDTH] IN BLOOD BY AUTOMATED COUNT: 13.9 % (ref 11.5–14.5)
EST. GFR  (AFRICAN AMERICAN): >60 ML/MIN/1.73 M^2
EST. GFR  (NON AFRICAN AMERICAN): >60 ML/MIN/1.73 M^2
GLUCOSE SERPL-MCNC: 120 MG/DL (ref 70–110)
HCT VFR BLD AUTO: 52.4 % (ref 40–54)
HGB BLD-MCNC: 17.5 G/DL (ref 14–18)
IMM GRANULOCYTES # BLD AUTO: 0.09 K/UL (ref 0–0.04)
IMM GRANULOCYTES NFR BLD AUTO: 0.6 % (ref 0–0.5)
LYMPHOCYTES # BLD AUTO: 3.6 K/UL (ref 1–4.8)
LYMPHOCYTES NFR BLD: 23.6 % (ref 18–48)
MCH RBC QN AUTO: 27.3 PG (ref 27–31)
MCHC RBC AUTO-ENTMCNC: 33.4 G/DL (ref 32–36)
MCV RBC AUTO: 82 FL (ref 82–98)
MONOCYTES # BLD AUTO: 1.3 K/UL (ref 0.3–1)
MONOCYTES NFR BLD: 8.1 % (ref 4–15)
NEUTROPHILS # BLD AUTO: 10.3 K/UL (ref 1.8–7.7)
NEUTROPHILS NFR BLD: 67.1 % (ref 38–73)
NRBC BLD-RTO: 0 /100 WBC
PLATELET # BLD AUTO: 227 K/UL (ref 150–450)
PMV BLD AUTO: 10.1 FL (ref 9.2–12.9)
POTASSIUM SERPL-SCNC: 3.6 MMOL/L (ref 3.5–5.1)
PROT SERPL-MCNC: 8.2 G/DL (ref 6–8.4)
RBC # BLD AUTO: 6.42 M/UL (ref 4.6–6.2)
SARS-COV-2 RDRP RESP QL NAA+PROBE: NEGATIVE
SODIUM SERPL-SCNC: 139 MMOL/L (ref 136–145)
TROPONIN I SERPL DL<=0.01 NG/ML-MCNC: <0.03 NG/ML
TSH SERPL DL<=0.005 MIU/L-ACNC: 1.17 UIU/ML (ref 0.34–5.6)
WBC # BLD AUTO: 15.37 K/UL (ref 3.9–12.7)

## 2022-01-17 PROCEDURE — 99284 EMERGENCY DEPT VISIT MOD MDM: CPT | Mod: 25

## 2022-01-17 PROCEDURE — 84484 ASSAY OF TROPONIN QUANT: CPT | Performed by: EMERGENCY MEDICINE

## 2022-01-17 PROCEDURE — 85025 COMPLETE CBC W/AUTO DIFF WBC: CPT | Performed by: EMERGENCY MEDICINE

## 2022-01-17 PROCEDURE — 80053 COMPREHEN METABOLIC PANEL: CPT | Performed by: EMERGENCY MEDICINE

## 2022-01-17 PROCEDURE — 84443 ASSAY THYROID STIM HORMONE: CPT | Performed by: EMERGENCY MEDICINE

## 2022-01-17 PROCEDURE — U0002 COVID-19 LAB TEST NON-CDC: HCPCS | Performed by: EMERGENCY MEDICINE

## 2022-01-17 PROCEDURE — 93005 ELECTROCARDIOGRAM TRACING: CPT | Performed by: INTERNAL MEDICINE

## 2022-01-18 NOTE — ED PROVIDER NOTES
Encounter Date: 2022       History     Chief Complaint   Patient presents with    Hypertension    Headache     61-year-old male presents complaining of headache and elevated blood pressure patient reports that he felt like his sinuses were congested yesterday and took some sinus medication patient believes that this might be the cause of his elevated blood pressure.  Patient denies chest pain patient denies shortness of breath patient denies fever or cough patient has no other acute complaints at this time        Review of patient's allergies indicates:   Allergen Reactions    Diovan [valsartan]      Past Medical History:   Diagnosis Date    ACL (anterior cruciate ligament) rupture     both knees    Degenerative disc disease     Depression     DJD (degenerative joint disease) of knee     both Knees both    GERD (gastroesophageal reflux disease)     Hypertension      Past Surgical History:   Procedure Laterality Date    WISDOM TOOTH EXTRACTION       Family History   Problem Relation Age of Onset    Hypertension Mother     Stroke Father     Hypertension Brother     Hypertension Brother     Cancer Neg Hx     Early death Neg Hx     Melanoma Neg Hx     Psoriasis Neg Hx     Lupus Neg Hx     Eczema Neg Hx      Social History     Tobacco Use    Smoking status: Former Smoker     Quit date: 1999     Years since quittin.4    Smokeless tobacco: Never Used   Substance Use Topics    Alcohol use: Yes     Alcohol/week: 2.0 standard drinks     Types: 2 Cans of beer per week     Comment: occasional    Drug use: No     Review of Systems   Constitutional: Negative for fever.   HENT: Negative for congestion, rhinorrhea, sore throat and trouble swallowing.    Eyes: Negative for visual disturbance.   Respiratory: Negative for cough, chest tightness, shortness of breath and wheezing.    Cardiovascular: Negative for chest pain, palpitations and leg swelling.   Gastrointestinal: Negative for abdominal  distention, abdominal pain, constipation, diarrhea, nausea and vomiting.   Genitourinary: Negative for difficulty urinating, dysuria, flank pain and frequency.   Musculoskeletal: Negative for arthralgias, back pain, joint swelling and neck pain.   Skin: Negative for color change and rash.   Neurological: Positive for headaches. Negative for dizziness, syncope, speech difficulty, weakness and numbness.   All other systems reviewed and are negative.      Physical Exam     Initial Vitals [01/17/22 1930]   BP Pulse Resp Temp SpO2   (!) 187/122 70 18 98.3 °F (36.8 °C) 98 %      MAP       --         Physical Exam    Nursing note and vitals reviewed.  Constitutional: He appears well-developed and well-nourished. He is not diaphoretic. No distress.   HENT:   Head: Normocephalic and atraumatic.   Right Ear: External ear normal.   Left Ear: External ear normal.   Nose: Nose normal.   Mouth/Throat: Oropharynx is clear and moist. No oropharyngeal exudate.   Eyes: Conjunctivae and EOM are normal. Pupils are equal, round, and reactive to light. Right eye exhibits no discharge. Left eye exhibits no discharge. No scleral icterus.   Neck: Neck supple. No thyromegaly present. No tracheal deviation present. No JVD present.   Normal range of motion.  Cardiovascular: Normal rate, regular rhythm, normal heart sounds and intact distal pulses. Exam reveals no gallop and no friction rub.    No murmur heard.  Pulmonary/Chest: Breath sounds normal. No stridor. No respiratory distress. He has no wheezes. He has no rhonchi. He has no rales. He exhibits no tenderness.   Abdominal: Abdomen is soft. Bowel sounds are normal. He exhibits no distension and no mass. There is no abdominal tenderness. There is no rebound and no guarding.   Musculoskeletal:         General: No tenderness or edema. Normal range of motion.      Cervical back: Normal range of motion and neck supple.     Lymphadenopathy:     He has no cervical adenopathy.   Neurological: He  is alert and oriented to person, place, and time. He has normal strength. No cranial nerve deficit or sensory deficit.   Skin: Skin is warm and dry. No rash noted. No erythema.         ED Course   Procedures  Labs Reviewed   CBC W/ AUTO DIFFERENTIAL - Abnormal; Notable for the following components:       Result Value    WBC 15.37 (*)     RBC 6.42 (*)     Immature Granulocytes 0.6 (*)     Gran # (ANC) 10.3 (*)     Immature Grans (Abs) 0.09 (*)     Mono # 1.3 (*)     All other components within normal limits   COMPREHENSIVE METABOLIC PANEL - Abnormal; Notable for the following components:    Glucose 120 (*)     Total Bilirubin 1.2 (*)     All other components within normal limits   SARS-COV-2 RNA AMPLIFICATION, QUAL   TROPONIN I   TSH          Imaging Results          X-Ray Chest AP Portable (In process)                CT Head Without Contrast (Final result)  Result time 01/17/22 20:24:34    Final result by Magdi Roldan MD (01/17/22 20:24:34)                 Narrative:    HISTORY:  Subarachnoid hemorrhage (SAH) suspected    TECHNIQUE:  Axial CT scan images of the brain obtained with sagittal and coronal reconstructions. Dose reduction techniques were employed including smart MA, use of patient weight and/or use of noise index. Total DLP: 935.8 mGy-cm. CTDIvol (mGy)= 57.5.    COMPARISON:  None.    FINDINGS:  There is no evidence of acute intracranial hemorrhage or definite cortical infarction.    The ventricles, cisterns and sulci are within normal limits.  No hydrocephalus. No midline shift or extra-axial fluid collection.  Basilar cisterns are patent.   The proximal M1 segments of the MCA's show no definite asymmetric hyperdensity.    No skull fracture.    The visualized paranasal sinuses and mastoid air cells are clear.    IMPRESSION:    No evidence of acute intracranial hemorrhage or definite cortical infarction.    Electronically signed by:  Magdi Roldan MD  1/17/2022 8:24 PM CST Workstation: [id number]                                Medications - No data to display  Medical Decision Making:   History:   Old Medical Records: I decided to obtain old medical records.  Initial Assessment:   Emergent evaluation of a 61-year-old male presenting with elevated blood pressure, differential diagnosis includes medication side effect, hypertension, hypertensive urgency            Attending Attestation:             Attending ED Notes:   Patient imaging shows no acute abnormalities patient's blood work shows no significant acute abnormalities.  Patient blood pressure currently 176/95 patient to continue taking his current blood pressure regimen patient advised to stop using decongestants patient is to continue taking his antibiotic as prescribed patient is advised to follow up with PCP in the next 2-3 days for further evaluation and management and cautioned to return immediately to the emergency department for any worsening or for any further concerns.               Clinical Impression:   Final diagnoses:  [I10] HTN (hypertension) (Primary)          ED Disposition Condition    Discharge Stable        ED Prescriptions     None        Follow-up Information     Follow up With Specialties Details Why Contact Info Additional Information    Chuy Reed MD Internal Medicine Schedule an appointment as soon as possible for a visit in 2 days  [address] [place] [address]  [address]  [facility] [address]  [phone]       [facility] - Emergency Dept Emergency Medicine  If symptoms worsen [address] [facility] [address]  [phone] 1st floor           Cheikh Lewis MD  01/17/22 2140

## 2022-01-21 ENCOUNTER — NURSE TRIAGE (OUTPATIENT)
Dept: ADMINISTRATIVE | Facility: CLINIC | Age: 62
End: 2022-01-21
Payer: MEDICARE

## 2022-01-21 NOTE — TELEPHONE ENCOUNTER
OOC RN  Patient calling.  155/100 b/p Has been taking cold medicine Care advise is to see dr.  In 2 weeks will look for appt.  For any worsening symptoms to call me OOC RN   Patient VU. Says he see Juvenal Magana as his PCP.      Reason for Disposition   Systolic BP >= 130 OR Diastolic >= 80, and is taking BP medications    Additional Information   Negative: Sounds like a life-threatening emergency to the triager   Negative: Pregnant 20 or more weeks or postpartum (< 6 weeks after delivery) with new hand or face swelling   Negative: Pregnant 20 or more weeks or postpartum with Systolic BP >= 140 OR Diastolic >= 90   Negative: Systolic BP >= 160 OR Diastolic >= 100, and any cardiac or neurologic symptoms (e.g., chest pain, difficulty breathing, unsteady gait, blurred vision)   Negative: Patient sounds very sick or weak to the triager   Negative: Systolic BP >= 200 OR Diastolic >= 120 and having NO cardiac or neurologic symptoms   Negative: Systolic BP >= 180 OR Diastolic >= 110, and missed most recent dose of blood pressure medication   Negative: Systolic BP >= 180 OR Diastolic >= 110   Negative: Patient wants to be seen   Negative: Ran out of BP medications   Negative: Taking BP medications and feels is having side effects (e.g., impotence, cough, dizziness)   Negative: Systolic BP >= 160 OR Diastolic >= 100   Negative: Systolic BP >= 130 OR Diastolic >= 80, and pregnant    Protocols used: BLOOD PRESSURE - HIGH-A-OH

## 2022-01-24 ENCOUNTER — TELEPHONE (OUTPATIENT)
Dept: FAMILY MEDICINE | Facility: CLINIC | Age: 62
End: 2022-01-24
Payer: MEDICARE

## 2022-01-24 NOTE — TELEPHONE ENCOUNTER
----- Message from Felicita  sent at 1/21/2022 10:48 AM CST -----  Regarding: same day access  Type: Needs Medical Advice    Who Called:      Best Call Back Number:   Additional Information: Requesting a call back from Nurse, regarding pt states BP is High with meds it still reading 155/100 and needs to be seen today ,please advise and call back soon

## 2022-02-07 ENCOUNTER — TELEPHONE (OUTPATIENT)
Dept: FAMILY MEDICINE | Facility: CLINIC | Age: 62
End: 2022-02-07
Payer: MEDICARE

## 2022-02-07 NOTE — TELEPHONE ENCOUNTER
HARPREET for patient letting him know we will need to R/S his appointment her has tomorrow on 02/08/22 with  he will not be in clinic

## 2022-02-14 ENCOUNTER — TELEPHONE (OUTPATIENT)
Dept: FAMILY MEDICINE | Facility: CLINIC | Age: 62
End: 2022-02-14
Payer: MEDICARE

## 2022-02-14 ENCOUNTER — OFFICE VISIT (OUTPATIENT)
Dept: FAMILY MEDICINE | Facility: CLINIC | Age: 62
End: 2022-02-14
Payer: MEDICARE

## 2022-02-14 VITALS
HEART RATE: 79 BPM | OXYGEN SATURATION: 95 % | BODY MASS INDEX: 29.91 KG/M2 | DIASTOLIC BLOOD PRESSURE: 78 MMHG | TEMPERATURE: 98 F | HEIGHT: 71 IN | SYSTOLIC BLOOD PRESSURE: 128 MMHG | RESPIRATION RATE: 17 BRPM | WEIGHT: 213.63 LBS

## 2022-02-14 DIAGNOSIS — M21.922 ACQUIRED ARM DEFORMITY, LEFT: ICD-10-CM

## 2022-02-14 DIAGNOSIS — M25.522 LEFT ELBOW PAIN: ICD-10-CM

## 2022-02-14 DIAGNOSIS — M79.602 LEFT ARM PAIN: ICD-10-CM

## 2022-02-14 DIAGNOSIS — Z12.11 COLON CANCER SCREENING: ICD-10-CM

## 2022-02-14 DIAGNOSIS — I10 PRIMARY HYPERTENSION: Primary | Chronic | ICD-10-CM

## 2022-02-14 DIAGNOSIS — Z13.220 LIPID SCREENING: ICD-10-CM

## 2022-02-14 DIAGNOSIS — E78.5 DYSLIPIDEMIA: Chronic | ICD-10-CM

## 2022-02-14 DIAGNOSIS — Z12.5 PROSTATE CANCER SCREENING: ICD-10-CM

## 2022-02-14 DIAGNOSIS — M54.12 CERVICAL RADICULOPATHY: ICD-10-CM

## 2022-02-14 DIAGNOSIS — D72.829 LEUKOCYTOSIS, UNSPECIFIED TYPE: ICD-10-CM

## 2022-02-14 DIAGNOSIS — Z13.6 ENCOUNTER FOR SCREENING FOR CARDIOVASCULAR DISORDERS: ICD-10-CM

## 2022-02-14 PROCEDURE — 99214 PR OFFICE/OUTPT VISIT, EST, LEVL IV, 30-39 MIN: ICD-10-PCS | Mod: S$GLB,,, | Performed by: PHYSICIAN ASSISTANT

## 2022-02-14 PROCEDURE — 1159F MED LIST DOCD IN RCRD: CPT | Mod: CPTII,S$GLB,, | Performed by: PHYSICIAN ASSISTANT

## 2022-02-14 PROCEDURE — 3078F DIAST BP <80 MM HG: CPT | Mod: CPTII,S$GLB,, | Performed by: PHYSICIAN ASSISTANT

## 2022-02-14 PROCEDURE — 3074F SYST BP LT 130 MM HG: CPT | Mod: CPTII,S$GLB,, | Performed by: PHYSICIAN ASSISTANT

## 2022-02-14 PROCEDURE — 3074F PR MOST RECENT SYSTOLIC BLOOD PRESSURE < 130 MM HG: ICD-10-PCS | Mod: CPTII,S$GLB,, | Performed by: PHYSICIAN ASSISTANT

## 2022-02-14 PROCEDURE — 99214 OFFICE O/P EST MOD 30 MIN: CPT | Mod: S$GLB,,, | Performed by: PHYSICIAN ASSISTANT

## 2022-02-14 PROCEDURE — 1159F PR MEDICATION LIST DOCUMENTED IN MEDICAL RECORD: ICD-10-PCS | Mod: CPTII,S$GLB,, | Performed by: PHYSICIAN ASSISTANT

## 2022-02-14 PROCEDURE — 99999 PR PBB SHADOW E&M-EST. PATIENT-LVL V: ICD-10-PCS | Mod: PBBFAC,,, | Performed by: PHYSICIAN ASSISTANT

## 2022-02-14 PROCEDURE — 1160F RVW MEDS BY RX/DR IN RCRD: CPT | Mod: CPTII,S$GLB,, | Performed by: PHYSICIAN ASSISTANT

## 2022-02-14 PROCEDURE — 3008F BODY MASS INDEX DOCD: CPT | Mod: CPTII,S$GLB,, | Performed by: PHYSICIAN ASSISTANT

## 2022-02-14 PROCEDURE — 3078F PR MOST RECENT DIASTOLIC BLOOD PRESSURE < 80 MM HG: ICD-10-PCS | Mod: CPTII,S$GLB,, | Performed by: PHYSICIAN ASSISTANT

## 2022-02-14 PROCEDURE — 3008F PR BODY MASS INDEX (BMI) DOCUMENTED: ICD-10-PCS | Mod: CPTII,S$GLB,, | Performed by: PHYSICIAN ASSISTANT

## 2022-02-14 PROCEDURE — 1160F PR REVIEW ALL MEDS BY PRESCRIBER/CLIN PHARMACIST DOCUMENTED: ICD-10-PCS | Mod: CPTII,S$GLB,, | Performed by: PHYSICIAN ASSISTANT

## 2022-02-14 PROCEDURE — 99999 PR PBB SHADOW E&M-EST. PATIENT-LVL V: CPT | Mod: PBBFAC,,, | Performed by: PHYSICIAN ASSISTANT

## 2022-02-14 RX ORDER — CHOLECALCIFEROL (VITAMIN D3) 50 MCG
2000 TABLET ORAL DAILY
COMMUNITY
Start: 2021-12-30

## 2022-02-14 RX ORDER — ACETAMINOPHEN 500 MG
500 TABLET ORAL 2 TIMES DAILY PRN
COMMUNITY
Start: 2021-12-30

## 2022-02-14 RX ORDER — CETIRIZINE HYDROCHLORIDE 10 MG/1
10 TABLET ORAL DAILY PRN
COMMUNITY
Start: 2021-12-30

## 2022-02-14 RX ORDER — OMEPRAZOLE 20 MG/1
20 CAPSULE, DELAYED RELEASE ORAL DAILY
COMMUNITY
Start: 2021-12-30

## 2022-02-14 RX ORDER — LIDOCAINE 50 MG/G
5 PATCH TOPICAL DAILY
COMMUNITY
Start: 2021-12-30

## 2022-02-14 NOTE — TELEPHONE ENCOUNTER
----- Message from Natalia Sheikh sent at 2/14/2022 11:23 AM CST -----  Contact: patient  Type:  Patient Returning Call    Who Called:  patient   Who Left Message for Patient:  Chiquita Copeland  Does the patient know what this is regarding?:  yes  Best Call Back Number:  [phone]    Additional Information:

## 2022-02-14 NOTE — TELEPHONE ENCOUNTER
----- Message from Mansi Cantu sent at 2/14/2022 10:39 AM CST -----  Contact: pt  Type:  Sooner Apoointment Request    Caller is requesting a sooner appointment.  Caller declined first available appointment listed below.  Caller will not accept being placed on the waitlist and is requesting a message be sent to doctor.    Name of Caller:  pt  When is the first available appointment?  3/21/22  Symptoms:  yearly check up  Best Call Back Number:  [phone]  Additional Information:  pt had appt on 2/8/22 that was scheduled due to provider being sick.  Pt stated he Would like to be seen this week.

## 2022-02-14 NOTE — PATIENT INSTRUCTIONS
If you are due for any health screening(s) below please notify me so we can arrange them to be ordered and scheduled to maintain your health.     Health Maintenance   Topic Date Due    TETANUS VACCINE  Never done    PROSTATE-SPECIFIC ANTIGEN  05/28/2020    Lipid Panel  05/28/2024    Hepatitis C Screening  Completed          Colon Cancer Screening    Of cancers affecting both men and women, colorectal cancer is the third leading cancer killer in the [place]. But it doesnt have to be. Screening can prevent colorectal cancer or find it at an early stage when treatment often leads to a cure.    A colonoscopy is the preferred test for detecting colon cancer. It is needed only once every 10 years if results are negative. While sedated, a flexible, lighted tube with a tiny camera is inserted into the rectum and advanced through the colon to look for cancers. An alternative screening test that is used at home and returned to the lab may also be used. It detects hidden blood in bowel movements which could indicate cancer in the colon. If results are positive, you will need a colonoscopy to determine if the blood is a sign of cancer. This type of follow up (diagnostic) colonoscopy usually requires additional copays as required by your insurance provider. Please contact your PCP if you have any questions.    Although you are still overdue for this important screening, due to the COVID-19 pandemic, we recommend scheduling it in the near future.

## 2022-02-14 NOTE — PROGRESS NOTES
Subjective:       Patient ID: Shamar Anne is a 62 y.o. male.    Chief Complaint: Annual Exam and Arm Pain (Left - knot )    Mr. Anne is a 62 year old male with HTN and dyslipidemia. The patient is here today for follow up of chronic conditions. The patient reports that he is having pain in his left arm that occurs intermittently. The patient reports that this feels like something is moving and shooting down and through his arm. MRI of cervical spine did reveal narrowing. The patient wishes to follow up with Dr. Gomez. He also complains of pain in his elbow. The patient was recently seen in the ED for HTN. The patient had a EKG there. The [facility]; the patient also reports [facility].    Review of patient's allergies indicates:   Allergen Reactions    Diovan [valsartan]          Current Outpatient Medications:     acetaminophen (TYLENOL) 500 MG tablet, Take 500 mg by mouth 2 (two) times daily as needed for Pain., Disp: , Rfl:     amlodipine (NORVASC) 10 MG tablet, Take 1 tablet (10 mg total) by mouth once daily., Disp: 90 tablet, Rfl: 3    cetirizine (ZYRTEC) 10 MG tablet, Take 10 mg by mouth daily as needed., Disp: , Rfl:     cholecalciferol, vitamin D3, (VITAMIN D3) 50 mcg (2,000 unit) Tab, Take 2,000 Units by mouth once daily., Disp: , Rfl:     diclofenac (VOLTAREN) 75 MG EC tablet, TAKE 1 TABLET(75 MG) BY MOUTH TWICE DAILY, Disp: 60 tablet, Rfl: 0    fluticasone propionate (FLONASE) 50 mcg/actuation nasal spray, SHAKE LIQUID AND USE 1 SPRAY IN EACH NOSTRIL DAILY, Disp: 16 mL, Rfl: 0    LIDOcaine (LIDODERM) 5 %, Place 5 patches onto the skin Daily., Disp: , Rfl:     omeprazole (PRILOSEC) 20 MG capsule, Take 20 mg by mouth once daily., Disp: , Rfl:     oxyCODONE-acetaminophen (PERCOCET)  mg per tablet, Take 1 tablet by mouth every 6 (six) hours as needed., Disp: , Rfl:     sildenafiL (VIAGRA) 100 MG tablet, TAKE ONE  TABLET BY MOUTH EVERY WEEK AS NEEDED 30 TO 60 MINUTES BEFORE INTERCOURSE FOR BEST RESULTS TAKE ON EMPTY STOMACH, Disp: , Rfl:     levocetirizine (XYZAL) 5 MG tablet, Take 1 tablet (5 mg total) by mouth every evening., Disp: 30 tablet, Rfl: 11    pantoprazole (PROTONIX) 40 MG tablet, Take 1 tablet (40 mg total) by mouth once daily., Disp: 90 tablet, Rfl: 1    rosuvastatin (CRESTOR) 10 MG tablet, Take 1 tablet (10 mg total) by mouth once daily., Disp: 90 tablet, Rfl: 3    Lab Results   Component Value Date    WBC 15.37 (H) 01/17/2022    HGB 17.5 01/17/2022    HCT 52.4 01/17/2022     01/17/2022    CHOL 188 05/28/2019    TRIG 184 (H) 05/28/2019    HDL 40 05/28/2019    ALT 19 01/17/2022    AST 19 01/17/2022     01/17/2022    K 3.6 01/17/2022     01/17/2022    CREATININE 0.8 01/17/2022    BUN 10 01/17/2022    CO2 23 01/17/2022    TSH 1.170 01/17/2022    PSA 2.3 05/28/2019       Review of Systems   Constitutional: Negative for activity change, appetite change and fever.   HENT: Negative for postnasal drip, rhinorrhea and sinus pressure.    Eyes: Negative for visual disturbance.   Respiratory: Negative for cough and shortness of breath.    Cardiovascular: Negative for chest pain.   Gastrointestinal: Negative for abdominal distention and abdominal pain.   Genitourinary: Negative for difficulty urinating and dysuria.   Musculoskeletal: Positive for arthralgias. Negative for myalgias.   Neurological: Negative for headaches.        Neuropathic pain   Hematological: Negative for adenopathy.   Psychiatric/Behavioral: The patient is not nervous/anxious.        Objective:      Physical Exam  Constitutional:       General: He is not in acute distress.     Appearance: Normal appearance.   HENT:      Head: Normocephalic and atraumatic.      Right Ear: Tympanic membrane, ear canal and external ear normal.      Left Ear: Tympanic membrane, ear canal and external ear normal.      Mouth/Throat:      Pharynx: No  oropharyngeal exudate or posterior oropharyngeal erythema.   Eyes:      Conjunctiva/sclera: Conjunctivae normal.   Cardiovascular:      Rate and Rhythm: Normal rate and regular rhythm.   Pulmonary:      Effort: Pulmonary effort is normal. No respiratory distress.      Breath sounds: Normal breath sounds. No wheezing.   Abdominal:      General: Bowel sounds are normal. There is no distension.      Palpations: There is no mass.      Tenderness: There is no abdominal tenderness.   Musculoskeletal:      Right lower leg: No edema.      Left lower leg: No edema.   Neurological:      Mental Status: He is alert and oriented to person, place, and time.   Psychiatric:         Behavior: Behavior normal.         Assessment:       1. Primary hypertension    2. Dyslipidemia    3. Left arm pain    4. Acquired arm deformity, left    5. Cervical radiculopathy    6. Left elbow pain    7. Leukocytosis, unspecified type    8. Lipid screening    9. Prostate cancer screening    10. Encounter for screening for cardiovascular disorders     11. Colon cancer screening        Plan:       Shamar was seen today for annual exam and arm pain.    Diagnoses and all orders for this visit:    Primary hypertension  -     Comprehensive Metabolic Panel; Future  -     CBC Auto Differential; Future  Controlled  Continue current medication  Dyslipidemia  Update labs  Left arm pain  I suspect radiculopathy   Acquired arm deformity, left  -     X-Ray Elbow Complete Left; Future    Cervical radiculopathy  -     X-Ray Cervical Spine Complete 5 view; Future  -     Ambulatory referral/consult to Orthopedics; Future    Left elbow pain  -     X-Ray Elbow Complete Left; Future    Leukocytosis, unspecified type  -     CBC Auto Differential; Future    Lipid screening  -     Lipid Panel; Future    Prostate cancer screening  -     PSA, Screening; Future    Encounter for screening for cardiovascular disorders   -     Lipid Panel; Future    Colon cancer screening  [facility]

## 2022-02-17 ENCOUNTER — HOSPITAL ENCOUNTER (OUTPATIENT)
Dept: RADIOLOGY | Facility: CLINIC | Age: 62
Discharge: HOME | End: 2022-02-17
Attending: PHYSICIAN ASSISTANT
Payer: MEDICARE

## 2022-02-17 DIAGNOSIS — M25.522 LEFT ELBOW PAIN: ICD-10-CM

## 2022-02-17 DIAGNOSIS — M21.922 ACQUIRED ARM DEFORMITY, LEFT: ICD-10-CM

## 2022-02-17 DIAGNOSIS — M54.12 CERVICAL RADICULOPATHY: ICD-10-CM

## 2022-02-17 PROCEDURE — 72050 XR CERVICAL SPINE COMPLETE 5 VIEW: ICD-10-PCS | Mod: 26,,, | Performed by: RADIOLOGY

## 2022-02-17 PROCEDURE — 72050 X-RAY EXAM NECK SPINE 4/5VWS: CPT | Mod: 26,,, | Performed by: RADIOLOGY

## 2022-02-17 PROCEDURE — 73080 X-RAY EXAM OF ELBOW: CPT | Mod: 26,LT,S$GLB, | Performed by: RADIOLOGY

## 2022-02-17 PROCEDURE — 73080 XR ELBOW COMPLETE 3 VIEW LEFT: ICD-10-PCS | Mod: 26,LT,S$GLB, | Performed by: RADIOLOGY

## 2022-02-17 PROCEDURE — 73080 X-RAY EXAM OF ELBOW: CPT | Mod: TC,FY,PO,LT

## 2022-02-17 PROCEDURE — 72050 X-RAY EXAM NECK SPINE 4/5VWS: CPT | Mod: TC,FY,PO

## 2022-03-02 ENCOUNTER — OFFICE VISIT (OUTPATIENT)
Dept: ORTHOPEDICS | Facility: CLINIC | Age: 62
End: 2022-03-02
Payer: MEDICARE

## 2022-03-02 VITALS — BODY MASS INDEX: 29.82 KG/M2 | WEIGHT: 213 LBS | HEIGHT: 71 IN

## 2022-03-02 DIAGNOSIS — M77.12 LATERAL EPICONDYLITIS OF LEFT ELBOW: ICD-10-CM

## 2022-03-02 DIAGNOSIS — M47.816 LUMBAR FACET ARTHROPATHY: Primary | ICD-10-CM

## 2022-03-02 DIAGNOSIS — M51.36 LUMBAR DEGENERATIVE DISC DISEASE: ICD-10-CM

## 2022-03-02 DIAGNOSIS — M47.816 LUMBAR SPONDYLOSIS: ICD-10-CM

## 2022-03-02 DIAGNOSIS — M50.320 OTHER CERVICAL DISC DEGENERATION, MID-CERVICAL REGION, UNSPECIFIED LEVEL: ICD-10-CM

## 2022-03-02 PROCEDURE — 99214 OFFICE O/P EST MOD 30 MIN: CPT | Mod: 25,S$GLB,, | Performed by: ORTHOPAEDIC SURGERY

## 2022-03-02 PROCEDURE — 20551 TENDON ORIGIN: L ELBOW: ICD-10-PCS | Mod: LT,S$GLB,, | Performed by: ORTHOPAEDIC SURGERY

## 2022-03-02 PROCEDURE — 99214 PR OFFICE/OUTPT VISIT, EST, LEVL IV, 30-39 MIN: ICD-10-PCS | Mod: 25,S$GLB,, | Performed by: ORTHOPAEDIC SURGERY

## 2022-03-02 PROCEDURE — 1160F PR REVIEW ALL MEDS BY PRESCRIBER/CLIN PHARMACIST DOCUMENTED: ICD-10-PCS | Mod: S$GLB,,, | Performed by: ORTHOPAEDIC SURGERY

## 2022-03-02 PROCEDURE — 1160F RVW MEDS BY RX/DR IN RCRD: CPT | Mod: S$GLB,,, | Performed by: ORTHOPAEDIC SURGERY

## 2022-03-02 PROCEDURE — 20551 NJX 1 TENDON ORIGIN/INSJ: CPT | Mod: LT,S$GLB,, | Performed by: ORTHOPAEDIC SURGERY

## 2022-03-02 PROCEDURE — 3008F PR BODY MASS INDEX (BMI) DOCUMENTED: ICD-10-PCS | Mod: S$GLB,,, | Performed by: ORTHOPAEDIC SURGERY

## 2022-03-02 PROCEDURE — 3008F BODY MASS INDEX DOCD: CPT | Mod: S$GLB,,, | Performed by: ORTHOPAEDIC SURGERY

## 2022-03-02 RX ORDER — TRIAMCINOLONE ACETONIDE 40 MG/ML
40 INJECTION, SUSPENSION INTRA-ARTICULAR; INTRAMUSCULAR
Status: DISCONTINUED | OUTPATIENT
Start: 2022-03-02 | End: 2022-03-02 | Stop reason: HOSPADM

## 2022-03-02 RX ORDER — DICLOFENAC SODIUM 10 MG/G
2 GEL TOPICAL 4 TIMES DAILY
COMMUNITY
End: 2022-03-23 | Stop reason: ALTCHOICE

## 2022-03-02 RX ADMIN — TRIAMCINOLONE ACETONIDE 40 MG: 40 INJECTION, SUSPENSION INTRA-ARTICULAR; INTRAMUSCULAR at 02:03

## 2022-03-02 NOTE — PROGRESS NOTES
Subjective:       Patient ID: Shamar Anne is a 62 y.o. male.    Chief Complaint: Pain of the Spine (Pt is here with complaints of chronic back & neck pain. ) and Pain of the Neck      History of Present Illness    Prior to meeting with the patient I reviewed the medical chart in [facility]. This included reviewing the previous progress notes from our office, review of the patient's last appointment with their primary care provider, review of any visits to the emergency room, and review of any pain management appointments or procedures.   Patient is here to follow-up for multiple orthopedic issues.  Chief complaint is left upper extremity radicular symptoms.  This started over the last month.  Denies any new injury or incident that may have instigated his symptoms.  He also complains of left elbow lateral epicondyle pain.  He is also here follow-up for his chronic low back pain; his low back pain is intermittent and occurs maybe twice a month.  However when it does occur it is significant.    Current Medications  Current Outpatient Medications   Medication Sig Dispense Refill    acetaminophen (TYLENOL) 500 MG tablet Take 500 mg by mouth 2 (two) times daily as needed for Pain.      amlodipine (NORVASC) 10 MG tablet Take 1 tablet (10 mg total) by mouth once daily. 90 tablet 3    cetirizine (ZYRTEC) 10 MG tablet Take 10 mg by mouth daily as needed.      cholecalciferol, vitamin D3, (VITAMIN D3) 50 mcg (2,000 unit) Tab Take 2,000 Units by mouth once daily.      diclofenac sodium (VOLTAREN) 1 % Gel Apply 2 g topically 4 (four) times daily.      fluticasone propionate (FLONASE) 50 mcg/actuation nasal spray SHAKE LIQUID AND USE 1 SPRAY IN EACH NOSTRIL DAILY 16 mL 0    LIDOcaine (LIDODERM) 5 % Place 5 patches onto the skin Daily.      omeprazole (PRILOSEC) 20 MG capsule Take 20 mg by mouth once daily.      oxyCODONE-acetaminophen (PERCOCET)  mg per tablet Take 1 tablet by mouth every 6 (six) hours as needed.       pantoprazole (PROTONIX) 40 MG tablet Take 1 tablet (40 mg total) by mouth once daily. 90 tablet 1    sildenafiL (VIAGRA) 100 MG tablet TAKE ONE TABLET BY MOUTH EVERY WEEK AS NEEDED 30 TO 60 MINUTES BEFORE INTERCOURSE FOR BEST RESULTS TAKE ON EMPTY STOMACH      diclofenac (VOLTAREN) 75 MG EC tablet TAKE 1 TABLET(75 MG) BY MOUTH TWICE DAILY (Patient not taking: Reported on 3/2/2022) 60 tablet 0    levocetirizine (XYZAL) 5 MG tablet Take 1 tablet (5 mg total) by mouth every evening. 30 tablet 11    rosuvastatin (CRESTOR) 10 MG tablet Take 1 tablet (10 mg total) by mouth once daily. 90 tablet 3     No current facility-administered medications for this visit.       Allergies  Review of patient's allergies indicates:   Allergen Reactions    Diovan [valsartan]        Past Medical History  Past Medical History:   Diagnosis Date    ACL (anterior cruciate ligament) rupture     both knees    Degenerative disc disease     Depression     DJD (degenerative joint disease) of knee     both Knees both    GERD (gastroesophageal reflux disease)     Hypertension        Surgical History  Past Surgical History:   Procedure Laterality Date    WISDOM TOOTH EXTRACTION         Family History:   Family History   Problem Relation Age of Onset    Hypertension Mother     Stroke Father     Hypertension Brother     Hypertension Brother     Cancer Neg Hx     Early death Neg Hx     Melanoma Neg Hx     Psoriasis Neg Hx     Lupus Neg Hx     Eczema Neg Hx        Social History:   Social History     Socioeconomic History    Marital status: Legally    Occupational History    Occupation: contractor   Tobacco Use    Smoking status: Former Smoker     Quit date: 1999     Years since quittin.5    Smokeless tobacco: Never Used   Substance and Sexual Activity    Alcohol use: Yes     Alcohol/week: 2.0 standard drinks     Types: 2 Cans of beer per week     Comment: occasional    Drug use: No    Sexual activity:  Yes     Partners: Female     Birth control/protection: Condom       Hospitalization/Major Diagnostic Procedure:     Review of Systems     General/Constitutional:  Chills denies. Fatigue denies. Fever denies. Weight gain denies. Weight loss denies.    Respiratory:  Shortness of breath denies.    Cardiovascular:  Chest pain denies.    Gastrointestinal:  Constipation denies. Diarrhea denies. Nausea denies. Vomiting denies.     Hematology:  Easy bruising denies. Prolonged bleeding denies.     Genitourinary:  Frequent urination denies. Pain in lower back denies. Painful urination denies.     Musculoskeletal:  See HPI for details    Skin:  Rash denies.    Neurologic:  Dizziness denies. Gait abnormalities denies. Seizures denies. Tingling/Numbess denies.    Psychiatric:  Anxiety denies. Depressed mood denies.     Objective:   Vital Signs: There were no vitals filed for this visit.     Physical Exam      General Examination:     Constitutional: The patient is alert and oriented to lace person and time. Mood is pleasant.     Head/Face: Normal facial features normal eyebrows    Eyes: Normal extraocular motion bilaterally    Lungs: Respirations are equal and unlabored    Gait is coordinated.    Cardiovascular: There are no swelling or varicosities present.    Lymphatic: Negative for adenopathy    Skin: Normal    Neurological: Level of consciousness normal. Oriented to place person and time and situation    Psychiatric: Oriented to time place person and situation    Cervical exam demonstrates mildly limited range of motion in all planes mobility secondary to pain and guarding.  Bilateral upper extremities demonstrate full active range of motion, equal symmetric DTRs, normal strength except for a positive Faustina's on the left.  This is a new finding.    Left elbow exam demonstrates significant tenderness palpation over the lateral epicondyle and pain with resisted wrist extension.    Lumbar exam demonstrates a mildly antalgic  "gait with mildly limited end range flexion and extension.  Diffuse lumbar tenderness to palpation.  Bilateral lower extremities are distal neurovascular intact.    XRAY Report/ Interpretation:  Left elbow x-rays that were ordered and done elsewhere reviewed the patient the office today and it appears that he has evidence of a previous radial head compression fracture that is well healed.    Cervical x-rays ordered and done elsewhere were reviewed the patient demonstrate loss of any lordotic curvature and multilevel degenerative disc disease from C3 through C7 with massive anterior osteophytes.    Lumbar AP and lateral x-rays taken in the office today reviewed the patient demonstrates significant L5-S1 degenerative disc disease with facet sclerosis.    AP pelvis x-ray taken in the office today reviewed the patient demonstrates no significant abnormality      Assessment:       1. Lumbar facet arthropathy    2. Lumbar spondylosis    3. Lumbar degenerative disc disease    4. Other cervical disc degeneration, mid-cervical region, unspecified level    5. Lateral epicondylitis of left elbow        Plan:       Shamar was seen today for pain and pain.    Diagnoses and all orders for this visit:    Lumbar facet arthropathy  -     X-Ray Lumbar Spine Ap And Lateral  -     X-Ray Pelvis Routine AP    Lumbar spondylosis  -     X-Ray Lumbar Spine Ap And Lateral  -     X-Ray Pelvis Routine AP    Lumbar degenerative disc disease  -     X-Ray Lumbar Spine Ap And Lateral  -     X-Ray Pelvis Routine AP    Other cervical disc degeneration, mid-cervical region, unspecified level    Lateral epicondylitis of left elbow         Follow up in about 3 weeks (around 3/23/2022) for MRI Cervical Results.  Campos Sanchez, physician's assistant served in the capacity as a "scribe" for this patient encounter  A "face to face" encounter occurred with Dr. Gomez on this date  The treatment plan and medical decision making is outlined " below:  Regarding the cervical spine the onset of the left upper extremity radiculitis is new and therefore we will order a cervical MRI despite the fact that 1 was done just a year ago.  Follow-up in 3 weeks to review and discuss further treatment options such as pain management.  Regarding the left elbow lateral epicondylitis he was given an injection with 40 mg of Kenalog and 1 cc lidocaine.  This done using a sterile technique.  Please see procedure report for details.  Regarding his lumbar spine, at this time is manageable and we will continue to observe it.    Treatment options were discussed with regards to the nature of the medical condition. Conservative pain intervention and surgical options were discussed in detail. The probability of success of each separate treatment option was discussed. The patient expressed a clear understanding of the treatment options. With regards to surgery, the procedure risk, benefits, complications, and outcomes were discussed. No guarantees were given with regards to surgical outcome.   The risk of complications, morbidity, and mortality of patient management decisions have been made at the time of this visit. These are associated with the patient's problems, diagnostic procedures and treatment options. This includes the possible management options selected and those considered but not selected by the patient after shared medical decision making we discussed with the patient.     This note was created using Dragon voice recognition software that occasionally misinterpreted phrases or words.

## 2022-03-02 NOTE — PROCEDURES
Tendon Origin: L elbow    Date/Time: 3/2/2022 2:15 PM  Performed by: Aaron Gomez MD  Authorized by: Aaron Gomez MD     Consent Done?:  Yes (Verbal)  Timeout: prior to procedure the correct patient, procedure, and site was verified    Indications:  Pain  Site marked: the procedure site was marked    Timeout: prior to procedure the correct patient, procedure, and site was verified    Location:  Elbow  Site:  L elbow  Prep: patient was prepped and draped in usual sterile fashion    Needle size:  25 G  Medications:  40 mg triamcinolone acetonide 40 mg/mL  Patient tolerance:  Patient tolerated the procedure well with no immediate complications

## 2022-03-22 ENCOUNTER — HOSPITAL ENCOUNTER (OUTPATIENT)
Dept: RADIOLOGY | Facility: HOSPITAL | Age: 62
Discharge: HOME | End: 2022-03-22
Attending: ORTHOPAEDIC SURGERY
Payer: MEDICARE

## 2022-03-22 DIAGNOSIS — M50.320 OTHER CERVICAL DISC DEGENERATION, MID-CERVICAL REGION, UNSPECIFIED LEVEL: ICD-10-CM

## 2022-03-22 PROCEDURE — 72141 MRI NECK SPINE W/O DYE: CPT | Mod: TC,PO

## 2022-03-23 ENCOUNTER — OFFICE VISIT (OUTPATIENT)
Dept: ORTHOPEDICS | Facility: CLINIC | Age: 62
End: 2022-03-23
Payer: MEDICARE

## 2022-03-23 VITALS — WEIGHT: 213 LBS | HEIGHT: 71 IN | BODY MASS INDEX: 29.82 KG/M2

## 2022-03-23 DIAGNOSIS — M50.30 DEGENERATIVE DISC DISEASE, CERVICAL: Primary | ICD-10-CM

## 2022-03-23 DIAGNOSIS — M48.02 CERVICAL SPINAL STENOSIS: ICD-10-CM

## 2022-03-23 PROBLEM — N40.1 BENIGN PROSTATIC HYPERPLASIA WITH LOWER URINARY TRACT SYMPTOMS: Status: ACTIVE | Noted: 2022-03-23

## 2022-03-23 PROBLEM — N52.9 IMPOTENCE OF ORGANIC ORIGIN: Status: ACTIVE | Noted: 2022-03-23

## 2022-03-23 PROBLEM — E78.5 HYPERLIPIDEMIA: Status: ACTIVE | Noted: 2022-03-23

## 2022-03-23 PROBLEM — N52.9 PRIMARY ERECTILE DYSFUNCTION: Status: ACTIVE | Noted: 2022-03-23

## 2022-03-23 PROBLEM — N40.0 BENIGN PROSTATIC HYPERPLASIA: Status: ACTIVE | Noted: 2022-03-23

## 2022-03-23 PROBLEM — M25.569 KNEE PAIN: Status: ACTIVE | Noted: 2022-03-23

## 2022-03-23 PROBLEM — D12.6 BENIGN NEOPLASM OF COLON: Status: ACTIVE | Noted: 2022-03-23

## 2022-03-23 PROBLEM — F32.9 MAJOR DEPRESSIVE DISORDER: Status: ACTIVE | Noted: 2022-03-23

## 2022-03-23 PROBLEM — R79.89 ABNORMAL LIVER FUNCTION TESTS: Status: ACTIVE | Noted: 2022-03-23

## 2022-03-23 PROBLEM — G89.4 CHRONIC PAIN SYNDROME: Status: ACTIVE | Noted: 2022-03-23

## 2022-03-23 PROBLEM — J30.9 ALLERGIC RHINITIS: Status: ACTIVE | Noted: 2022-03-23

## 2022-03-23 PROCEDURE — 99213 PR OFFICE/OUTPT VISIT, EST, LEVL III, 20-29 MIN: ICD-10-PCS | Mod: S$GLB,,, | Performed by: ORTHOPAEDIC SURGERY

## 2022-03-23 PROCEDURE — 3008F PR BODY MASS INDEX (BMI) DOCUMENTED: ICD-10-PCS | Mod: S$GLB,,, | Performed by: ORTHOPAEDIC SURGERY

## 2022-03-23 PROCEDURE — 3008F BODY MASS INDEX DOCD: CPT | Mod: S$GLB,,, | Performed by: ORTHOPAEDIC SURGERY

## 2022-03-23 PROCEDURE — 1160F PR REVIEW ALL MEDS BY PRESCRIBER/CLIN PHARMACIST DOCUMENTED: ICD-10-PCS | Mod: S$GLB,,, | Performed by: ORTHOPAEDIC SURGERY

## 2022-03-23 PROCEDURE — 99213 OFFICE O/P EST LOW 20 MIN: CPT | Mod: S$GLB,,, | Performed by: ORTHOPAEDIC SURGERY

## 2022-03-23 PROCEDURE — 1160F RVW MEDS BY RX/DR IN RCRD: CPT | Mod: S$GLB,,, | Performed by: ORTHOPAEDIC SURGERY

## 2022-03-23 RX ORDER — MELOXICAM 7.5 MG/1
7.5 TABLET ORAL 2 TIMES DAILY
Qty: 60 TABLET | Refills: 3 | Status: SHIPPED | OUTPATIENT
Start: 2022-03-23 | End: 2022-04-22

## 2022-03-23 NOTE — PROGRESS NOTES
Subjective:       Patient ID: Shamar Anne is a 62 y.o. male.    Chief Complaint: Pain of the Neck (Cervical Pain. MRI results review)      History of Present Illness    Prior to meeting with the patient I reviewed the medical chart in [facility]. This included reviewing the previous progress notes from our office, review of the patient's last appointment with their primary care provider, review of any visits to the emergency room, and review of any pain management appointments or procedures.   Follow-up visit for neck pain patient has pain radiating down the left arm to left hand symptoms have not changed since last visit he discuss MRI results    Current Medications  Current Outpatient Medications   Medication Sig Dispense Refill    acetaminophen (TYLENOL) 500 MG tablet Take 500 mg by mouth 2 (two) times daily as needed for Pain.      amlodipine (NORVASC) 10 MG tablet Take 1 tablet (10 mg total) by mouth once daily. 90 tablet 3    cetirizine (ZYRTEC) 10 MG tablet Take 10 mg by mouth daily as needed.      cholecalciferol, vitamin D3, (VITAMIN D3) 50 mcg (2,000 unit) Tab Take 2,000 Units by mouth once daily.      diclofenac sodium (VOLTAREN) 1 % Gel Apply 2 g topically 4 (four) times daily.      fluticasone propionate (FLONASE) 50 mcg/actuation nasal spray SHAKE LIQUID AND USE 1 SPRAY IN EACH NOSTRIL DAILY 16 mL 0    LIDOcaine (LIDODERM) 5 % Place 5 patches onto the skin Daily.      omeprazole (PRILOSEC) 20 MG capsule Take 20 mg by mouth once daily.      oxyCODONE-acetaminophen (PERCOCET)  mg per tablet Take 1 tablet by mouth every 6 (six) hours as needed.      pantoprazole (PROTONIX) 40 MG tablet take 1 tablet by mouth once a day 90 tablet 1    sildenafiL (VIAGRA) 100 MG tablet TAKE ONE TABLET BY MOUTH EVERY WEEK AS NEEDED 30 TO 60 MINUTES BEFORE INTERCOURSE FOR BEST RESULTS TAKE ON EMPTY STOMACH      tadalafiL (CIALIS) 10 MG tablet take 1 tablet by mouth once every three days as needed 30 tablet 0     diclofenac (VOLTAREN) 75 MG EC tablet TAKE 1 TABLET(75 MG) BY MOUTH TWICE DAILY (Patient not taking: Reported on 3/23/2022) 60 tablet 0    levocetirizine (XYZAL) 5 MG tablet Take 1 tablet (5 mg total) by mouth every evening. 30 tablet 11    oxyCODONE-acetaminophen (PERCOCET)  mg per tablet take 1 tablet by mouth every six hours as needed (Patient not taking: Reported on 3/23/2022) 90 tablet 0    rosuvastatin (CRESTOR) 10 MG tablet Take 1 tablet (10 mg total) by mouth once daily. 90 tablet 3     No current facility-administered medications for this visit.       Allergies  Review of patient's allergies indicates:   Allergen Reactions    Diovan [valsartan]        Past Medical History  Past Medical History:   Diagnosis Date    ACL (anterior cruciate ligament) rupture     both knees    Degenerative disc disease     Depression     DJD (degenerative joint disease) of knee     both Knees both    GERD (gastroesophageal reflux disease)     Hypertension        Surgical History  Past Surgical History:   Procedure Laterality Date    WISDOM TOOTH EXTRACTION         Family History:   Family History   Problem Relation Age of Onset    Hypertension Mother     Stroke Father     Hypertension Brother     Hypertension Brother     Cancer Neg Hx     Early death Neg Hx     Melanoma Neg Hx     Psoriasis Neg Hx     Lupus Neg Hx     Eczema Neg Hx        Social History:   Social History     Socioeconomic History    Marital status: Legally    Occupational History    Occupation: contractor   Tobacco Use    Smoking status: Former Smoker     Quit date: 1999     Years since quittin.6    Smokeless tobacco: Never Used   Substance and Sexual Activity    Alcohol use: Yes     Alcohol/week: 2.0 standard drinks     Types: 2 Cans of beer per week     Comment: occasional    Drug use: No    Sexual activity: Yes     Partners: Female     Birth control/protection: Condom       Hospitalization/Major  Diagnostic Procedure:     Review of Systems     General/Constitutional:  Chills denies. Fatigue denies. Fever denies. Weight gain denies. Weight loss denies.    Respiratory:  Shortness of breath denies.    Cardiovascular:  Chest pain denies.    Gastrointestinal:  Constipation denies. Diarrhea denies. Nausea denies. Vomiting denies.     Hematology:  Easy bruising denies. Prolonged bleeding denies.     Genitourinary:  Frequent urination denies. Pain in lower back denies. Painful urination denies.     Musculoskeletal:  See HPI for details    Skin:  Rash denies.    Neurologic:  Dizziness denies. Gait abnormalities denies. Seizures denies. Tingling/Numbess denies.    Psychiatric:  Anxiety denies. Depressed mood denies.     Objective:   Vital Signs: There were no vitals filed for this visit.     Physical Exam      General Examination:     Constitutional: The patient is alert and oriented to lace person and time. Mood is pleasant.     Head/Face: Normal facial features normal eyebrows    Eyes: Normal extraocular motion bilaterally    Lungs: Respirations are equal and unlabored    Gait is coordinated.    Cardiovascular: There are no swelling or varicosities present.    Lymphatic: Negative for adenopathy    Skin: Normal    Neurological: Level of consciousness normal. Oriented to place person and time and situation    Psychiatric: Oriented to time place person and situation    Cervical range of motion moderate restricted Spurling's maneuver weakly positive on the left side motor exam grossly normal subjective numbness C6 nerve root distribution left side.  XRAY Report/ Interpretation:  Cervical MRI was reviewed there is multilevel cervical disc degeneration at at least 4 levels there is foraminal stenosis of significance on the left side at C5-6 and C6-7.      Assessment:       1. Degenerative disc disease, cervical    2. Cervical spinal stenosis        Plan:       Shamar was seen today for pain.    Diagnoses and all orders  for this visit:    Degenerative disc disease, cervical    Cervical spinal stenosis         No follow-ups on file.    Treatment options were discussed I offered to refer him to Pain Management for an epidural steroid injection on the left side versus observation.  At this point time he would rather consider his treatment options and deferred the cervical epidural steroid injection return 2-3 months trial of meloxicam  Treatment options were discussed with regards to the nature of the medical condition. Conservative pain intervention and surgical options were discussed in detail. The probability of success of each separate treatment option was discussed. The patient expressed a clear understanding of the treatment options. With regards to surgery, the procedure risk, benefits, complications, and outcomes were discussed. No guarantees were given with regards to surgical outcome.   The risk of complications, morbidity, and mortality of patient management decisions have been made at the time of this visit. These are associated with the patient's problems, diagnostic procedures and treatment options. This includes the possible management options selected and those considered but not selected by the patient after shared medical decision making we discussed with the patient.     This note was created using Dragon voice recognition software that occasionally misinterpreted phrases or words.

## 2022-06-25 ENCOUNTER — OFFICE VISIT (OUTPATIENT)
Dept: URGENT CARE | Facility: CLINIC | Age: 62
End: 2022-06-25
Payer: MEDICARE

## 2022-06-25 VITALS
DIASTOLIC BLOOD PRESSURE: 103 MMHG | BODY MASS INDEX: 29.82 KG/M2 | HEIGHT: 71 IN | SYSTOLIC BLOOD PRESSURE: 165 MMHG | HEART RATE: 75 BPM | RESPIRATION RATE: 16 BRPM | TEMPERATURE: 98 F | WEIGHT: 213 LBS | OXYGEN SATURATION: 98 %

## 2022-06-25 DIAGNOSIS — J02.9 SORE THROAT: Primary | ICD-10-CM

## 2022-06-25 DIAGNOSIS — R03.0 ELEVATED BLOOD PRESSURE READING: ICD-10-CM

## 2022-06-25 DIAGNOSIS — U07.1 COVID-19 VIRUS DETECTED: ICD-10-CM

## 2022-06-25 DIAGNOSIS — U07.1 COVID-19: ICD-10-CM

## 2022-06-25 LAB
CTP QC/QA: YES
SARS-COV-2 RDRP RESP QL NAA+PROBE: POSITIVE

## 2022-06-25 PROCEDURE — 3008F BODY MASS INDEX DOCD: CPT | Mod: CPTII,S$GLB,, | Performed by: PHYSICIAN ASSISTANT

## 2022-06-25 PROCEDURE — 99213 PR OFFICE/OUTPT VISIT, EST, LEVL III, 20-29 MIN: ICD-10-PCS | Mod: S$GLB,,, | Performed by: PHYSICIAN ASSISTANT

## 2022-06-25 PROCEDURE — 1159F PR MEDICATION LIST DOCUMENTED IN MEDICAL RECORD: ICD-10-PCS | Mod: CPTII,S$GLB,, | Performed by: PHYSICIAN ASSISTANT

## 2022-06-25 PROCEDURE — 3008F PR BODY MASS INDEX (BMI) DOCUMENTED: ICD-10-PCS | Mod: CPTII,S$GLB,, | Performed by: PHYSICIAN ASSISTANT

## 2022-06-25 PROCEDURE — 3077F PR MOST RECENT SYSTOLIC BLOOD PRESSURE >= 140 MM HG: ICD-10-PCS | Mod: CPTII,S$GLB,, | Performed by: PHYSICIAN ASSISTANT

## 2022-06-25 PROCEDURE — 99213 OFFICE O/P EST LOW 20 MIN: CPT | Mod: S$GLB,,, | Performed by: PHYSICIAN ASSISTANT

## 2022-06-25 PROCEDURE — 3080F DIAST BP >= 90 MM HG: CPT | Mod: CPTII,S$GLB,, | Performed by: PHYSICIAN ASSISTANT

## 2022-06-25 PROCEDURE — 1160F RVW MEDS BY RX/DR IN RCRD: CPT | Mod: CPTII,S$GLB,, | Performed by: PHYSICIAN ASSISTANT

## 2022-06-25 PROCEDURE — 1160F PR REVIEW ALL MEDS BY PRESCRIBER/CLIN PHARMACIST DOCUMENTED: ICD-10-PCS | Mod: CPTII,S$GLB,, | Performed by: PHYSICIAN ASSISTANT

## 2022-06-25 PROCEDURE — U0002 COVID-19 LAB TEST NON-CDC: HCPCS | Mod: QW,S$GLB,, | Performed by: PHYSICIAN ASSISTANT

## 2022-06-25 PROCEDURE — 3077F SYST BP >= 140 MM HG: CPT | Mod: CPTII,S$GLB,, | Performed by: PHYSICIAN ASSISTANT

## 2022-06-25 PROCEDURE — 1159F MED LIST DOCD IN RCRD: CPT | Mod: CPTII,S$GLB,, | Performed by: PHYSICIAN ASSISTANT

## 2022-06-25 PROCEDURE — 3080F PR MOST RECENT DIASTOLIC BLOOD PRESSURE >= 90 MM HG: ICD-10-PCS | Mod: CPTII,S$GLB,, | Performed by: PHYSICIAN ASSISTANT

## 2022-06-25 PROCEDURE — U0002: ICD-10-PCS | Mod: QW,S$GLB,, | Performed by: PHYSICIAN ASSISTANT

## 2022-06-25 NOTE — PROGRESS NOTES
"Subjective:       Patient ID: Shamar Anne is a 62 y.o. male.    Vitals:  height is 5' 11" (1.803 m) and weight is 96.6 kg (213 lb). His temperature is 97.7 °F (36.5 °C). His blood pressure is 165/103 (abnormal) and his pulse is 75. His respiration is 16 and oxygen saturation is 98%.     Chief Complaint: Sore Throat    Patient presents to urgent care today for sore throat, cough and congestion for approximately 5-6 days   Patient has been taking OTC medications with slight relief.   Patient did take a home COVID test 3 days ago, results were positive     Other  This is a new problem. The current episode started in the past 7 days. The problem occurs constantly. The problem has been gradually worsening. Associated symptoms include congestion, coughing and a sore throat. Pertinent negatives include no abdominal pain, anorexia, arthralgias, change in bowel habit, chest pain, chills, diaphoresis, fatigue, fever, headaches, joint swelling, myalgias, nausea, neck pain, numbness, rash, swollen glands, urinary symptoms, vertigo, visual change, vomiting or weakness. Treatments tried: OTC meds. The treatment provided mild relief.       Constitution: Negative for chills, sweating, fatigue and fever.   HENT: Positive for congestion and sore throat.    Neck: Negative for neck pain.   Cardiovascular: Negative for chest pain.   Respiratory: Positive for cough.    Gastrointestinal: Negative for abdominal pain, nausea and vomiting.   Musculoskeletal: Negative for joint pain, joint swelling and muscle ache.   Skin: Negative for rash.   Neurological: Negative for history of vertigo, headaches and numbness.       Objective:      Physical Exam   Constitutional: He is oriented to person, place, and time. He appears well-developed.  Non-toxic appearance. He does not appear ill. No distress.   HENT:   Head: Normocephalic and atraumatic.   Ears:   Right Ear: Hearing and external ear normal.   Left Ear: Hearing and external ear normal. "   Eyes: Conjunctivae and EOM are normal. Pupils are equal, round, and reactive to light.   Neck: Neck supple. No neck rigidity present.   Cardiovascular: Normal rate and regular rhythm.   Pulmonary/Chest: Effort normal and breath sounds normal. No respiratory distress. He has no decreased breath sounds. He has no wheezes. He has no rhonchi.   Lungs CTA         Comments: Lungs CTA    Neurological: He is alert and oriented to person, place, and time.   Skin: Skin is warm, dry and not diaphoretic.   Psychiatric: His speech is normal and behavior is normal. Mood normal.   Nursing note and vitals reviewed.    Office Visit on 06/25/2022   Component Date Value Ref Range Status    POC Rapid COVID 06/25/2022 Positive (A) Negative Final     Acceptable 06/25/2022 Yes   Final           Assessment:       1. Sore throat    2. COVID-19    3. Elevated blood pressure reading          Plan:       Past medical hx, family hx, social hx, and current medications were provided by the patient and were reviewed. Diagnostics performed today were discussed. Dx and tx were discussed with the patient. Quarantine recommendations based on CDC guidelines discussed. Return to [facility] for any concern. Follow up with PCP for any persistence of problem, or worsening. ER precautions. Pt verbalized understanding of all discussed, and agreed.    Some portions of the physical exam were omitted to help reduce chance of viral transmission.    Sore throat  -     POCT COVID-19 Rapid Screening    COVID-19  -     Ambulatory referral/consult to [facility]    Elevated blood pressure reading      Patient Instructions   · Follow up with your primary care if symptoms do not improve, or you may return here at any time.  · If you were referred to a specialist, please follow up with that specialty.  · If you were prescribed antibiotics, please take them to completion.  · If you were prescribed a narcotic or any medication with sedative effects, do not  "drive or operate heavy equipment or machinery while taking these medications.  · You must understand that you have received treatment at an Urgent Care facility only, and that you may be released before all of your medical problems are known or treated. Urgent Care facilities are not equipped to handle life threatening emergencies. It is recommended that you seek care at an Emergency Department for further evaluation of worsening or concerning symptoms, or possibly life threatening conditions as discussed.                                        If you  smoke, please stop smoking        Elevated Blood Pressure  Your blood pressure was elevated during your visit to the urgent care. It is recommended that you monitor your blood pressure over the next week or two to make sure that it is not consistently elevated.  Please have your blood pressure taken 2-3 times daily at different times of the day.  Write all of those blood pressures down and record the time that they were taken.  Keep all that information and take it with you to see your Primary Care Physician.  If your blood pressure is consistently above 140/90 you will need to follow up with your PCP more quickly. If you develop chest pain, severe headache, extremity weakness, trouble speaking, facial droop, or visual disturbance seek care in an Emergency Room immediately.     You have tested POSITIVE for COVID-19 today    Quarantine recommendations based on CDC guidelines:      No symptoms present: You must quarantine for 5 days starting on the day of the positive test.    Symptoms present: You must quarantine for 5 days starting on the day of symptom onset.      AFTER 5 days, if your symptoms have improved and you are fever free, you can return to the community on day 6.   The CDC recommends strict mask use for 5 days following quarantine end.      You cannot test negative to "test out" of quarantine. Per CDC recommendations we do not retest within 90 days of a " positive test.      Please refer to CDC website for additional information

## 2022-06-25 NOTE — PATIENT INSTRUCTIONS
Follow up with your primary care if symptoms do not improve, or you may return here at any time.  If you were referred to a specialist, please follow up with that specialty.  If you were prescribed antibiotics, please take them to completion.  If you were prescribed a narcotic or any medication with sedative effects, do not drive or operate heavy equipment or machinery while taking these medications.  You must understand that you have received treatment at an Urgent Care facility only, and that you may be released before all of your medical problems are known or treated. Urgent Care facilities are not equipped to handle life threatening emergencies. It is recommended that you seek care at an Emergency Department for further evaluation of worsening or concerning symptoms, or possibly life threatening conditions as discussed.                                        If you  smoke, please stop smoking        Elevated Blood Pressure  Your blood pressure was elevated during your visit to the urgent care. It is recommended that you monitor your blood pressure over the next week or two to make sure that it is not consistently elevated.  Please have your blood pressure taken 2-3 times daily at different times of the day.  Write all of those blood pressures down and record the time that they were taken.  Keep all that information and take it with you to see your Primary Care Physician.  If your blood pressure is consistently above 140/90 you will need to follow up with your PCP more quickly. If you develop chest pain, severe headache, extremity weakness, trouble speaking, facial droop, or visual disturbance seek care in an Emergency Room immediately.     You have tested POSITIVE for COVID-19 today    Quarantine recommendations based on CDC guidelines:      No symptoms present: You must quarantine for 5 days starting on the day of the positive test.    Symptoms present: You must quarantine for 5 days starting on the day of  "symptom onset.      AFTER 5 days, if your symptoms have improved and you are fever free, you can return to the community on day 6.   The CDC recommends strict mask use for 5 days following quarantine end.      You cannot test negative to "test out" of quarantine. Per CDC recommendations we do not retest within 90 days of a positive test.      Please refer to CDC website for additional information    "

## 2023-02-23 NOTE — TELEPHONE ENCOUNTER
----- Message from Jeffery Bone sent at 6/28/2017  4:00 PM CDT -----  Contact: Patient  States that he needs a refill for the hydrocodone-acetaminophen 7.5-325mg (NORCO) 7.5-325 mg per tablets and if any questions, please call [phone].  Thank you      [facility] [address] [place] [address] & [address] [address]  Phone: [phone] Fax: [phone]    
Patient requesting a refill of Hydrocodone.  LR--6-1-17  LOV--6-1-17  FOV--9-5-17  Urine Toxicology--6-1-17  Pain Contract--4-2-14  
no

## 2025-05-13 NOTE — TELEPHONE ENCOUNTER
Problem: Skin Integrity Alteration  Goal: Skin remains intact with no new/deterioration of wound or pressure injury  Outcome: Monitoring/Evaluating progress  Goal: Participates in wound care activities  Outcome: Monitoring/Evaluating progress     Wound Care Provider Visit     Patient seen in Wound Care Clinic by: ANJELICA Leung NP. RN assisted by: Michelle BONNER    Wound care therapy plan changes:  Vasche Wet to Moist packing strips, allevyn    DME requested: none  Lab(s)/imaging orders placed this visit: None  Wound(s) debrided by provider: No Consent obtained/verified: N/A  Dermal Applied: No Next Dermal Placement on: N/A    Wound care being completed by: other home care company  Supplies provided/ordered: Provided 1 week worth of supplies for home care services    Pt following up in [place] for wound care, already has visits scheduled.    ----- Message from Neela Vargas sent at 2/14/2017  1:02 PM CST -----  Contact: self  Patient called regarding refill of medication. Please contact [phone] (home)     hydrocodone-acetaminophen 7.5-325 mg per tablet    [facility] [id number] [place] [address] AT [facility] & [address] [address]  Phone: [phone] Fax: [phone]

## 2025-07-30 ENCOUNTER — OFFICE VISIT (OUTPATIENT)
Dept: ORTHOPEDICS | Facility: CLINIC | Age: 65
End: 2025-07-30
Payer: MEDICARE

## 2025-07-30 ENCOUNTER — HOSPITAL ENCOUNTER (OUTPATIENT)
Dept: RADIOLOGY | Facility: HOSPITAL | Age: 65
Discharge: HOME | End: 2025-07-30
Attending: ORTHOPAEDIC SURGERY
Payer: MEDICARE

## 2025-07-30 VITALS — WEIGHT: 212 LBS | BODY MASS INDEX: 29.68 KG/M2 | HEIGHT: 71 IN

## 2025-07-30 DIAGNOSIS — M47.812 ARTHROPATHY OF CERVICAL FACET JOINT: ICD-10-CM

## 2025-07-30 DIAGNOSIS — M54.12 CERVICAL RADICULOPATHY: ICD-10-CM

## 2025-07-30 DIAGNOSIS — R52 PAIN: Primary | ICD-10-CM

## 2025-07-30 DIAGNOSIS — M50.30 DDD (DEGENERATIVE DISC DISEASE), CERVICAL: ICD-10-CM

## 2025-07-30 PROCEDURE — 99999 PR PBB SHADOW E&M-EST. PATIENT-LVL IV: CPT | Mod: PBBFAC,,, | Performed by: ORTHOPAEDIC SURGERY

## 2025-07-30 PROCEDURE — 72040 X-RAY EXAM NECK SPINE 2-3 VW: CPT | Mod: TC,PN

## 2025-07-30 PROCEDURE — 72040 X-RAY EXAM NECK SPINE 2-3 VW: CPT | Mod: 26,,, | Performed by: RADIOLOGY

## 2025-07-30 RX ORDER — FOLIC ACID 1 MG/1
1 TABLET ORAL
COMMUNITY
Start: 2025-01-31

## 2025-07-30 RX ORDER — POTASSIUM CHLORIDE 750 MG/1
10 TABLET, EXTENDED RELEASE ORAL
COMMUNITY
Start: 2025-01-29

## 2025-07-30 RX ORDER — GABAPENTIN 300 MG/1
300 CAPSULE ORAL
COMMUNITY
Start: 2025-04-23

## 2025-07-30 RX ORDER — MELOXICAM 15 MG/1
15 TABLET ORAL DAILY
Qty: 30 TABLET | Refills: 3 | Status: SHIPPED | OUTPATIENT
Start: 2025-07-30

## 2025-07-30 NOTE — PROGRESS NOTES
Subjective:       Patient ID: Shamar Anne is a 65 y.o. male.    Chief Complaint: Pain of the Neck (Cervical pain, worse over the past 1 year. Pain begins at the top of his neck and radiates downwards into his L arm to the elbow. Endorses a tingling/jittering feeling in the L arm. Relief with cracking his neck. B/L arm numbness with L>R. Pain is intermittent and daily. Pain wakes from sleep. )      History of Present Illness    Prior to meeting with the patient I reviewed the medical chart in [facility]. This included reviewing the previous progress notes from our office, review of the patient's last appointment with their primary care provider, review of any visits to the emergency room, and review of any pain management appointments or procedures.   Shamar comes in today with a chief complaint of neck pain and left upper extremity radicular symptoms.  He has had similar symptoms in the past, about 3 years ago.  They kind of wax and wane.  This current flare-up that has been going on for a few months.  Does not recollect any new injury or trauma.  Denies any right upper extremity radicular symptoms.    Current Medications  Current Medications[1]    Allergies  Review of patient's allergies indicates:   Allergen Reactions    Diovan [valsartan]     Rosuvastatin Anxiety     Depression, anxiety, lucid dreams       Past Medical History  Past Medical History:   Diagnosis Date    ACL (anterior cruciate ligament) rupture     both knees    Degenerative disc disease     Depression     DJD (degenerative joint disease) of knee     both Knees both    GERD (gastroesophageal reflux disease)     Hypertension        Surgical History  Past Surgical History:   Procedure Laterality Date    WISDOM TOOTH EXTRACTION         Family History:   Family History   Problem Relation Name Age of Onset    Hypertension Mother      Stroke Father      Hypertension Brother      Hypertension Brother      Cancer Neg Hx      Early death Neg Hx       Melanoma Neg Hx      Psoriasis Neg Hx      Lupus Neg Hx      Eczema Neg Hx         Social History:   Social History[2]    Hospitalization/Major Diagnostic Procedure:     Review of Systems     General/Constitutional:  Chills denies. Fatigue denies. Fever denies. Weight gain denies. Weight loss denies.    Respiratory:  Shortness of breath denies.    Cardiovascular:  Chest pain denies.    Gastrointestinal:  Constipation denies. Diarrhea denies. Nausea denies. Vomiting denies.     Hematology:  Easy bruising denies. Prolonged bleeding denies.     Genitourinary:  Frequent urination denies. Pain in lower back denies. Painful urination denies.     Musculoskeletal:  See HPI for details    Skin:  Rash denies.    Neurologic:  Dizziness denies. Gait abnormalities denies. Seizures denies. Tingling/Numbess denies.    Psychiatric:  Anxiety denies. Depressed mood denies.     Objective:   Vital Signs: There were no vitals filed for this visit.     Physical Exam      General Examination:     Constitutional: The patient is alert and oriented to lace person and time. Mood is pleasant.     Head/Face: Normal facial features normal eyebrows    Eyes: Normal extraocular motion bilaterally    Lungs: Respirations are equal and unlabored    Gait is coordinated.    Cardiovascular: There are no swelling or varicosities present.    Lymphatic: Negative for adenopathy    Skin: Normal    Neurological: Level of consciousness normal. Oriented to place person and time and situation    Psychiatric: Oriented to time place person and situation      Cervical exam: Skin to the neck clean dry and intact.  No erythema or ecchymosis.  No signs or symptoms of infection.  Neurovascularly intact throughout the right upper extremity.  Subjective paresthesias in the C5-6 dermatome.  He has well-preserved range of motion of the cervical spine with flexion / extension and lateral bending but does have crepitus. Equivocal Spurling's.  He can open and close both hands  into a fist.  He can oppose his right thumb to all digits in the right hand and left thumb to all digits in his left hand.   strength would grade 5/5 and is equal bilaterally.    XRAY Report/ Interpretation :   Two views taken of the cervical spine today: AP and lateral views.  No acute fractures or dislocations seen.  He has marked degenerative changes starting at C3-4 extending to C7-T1 with diffuse osteophytic endplate changes and facet sclerosis.    Assessment:       1. Pain    2. DDD (degenerative disc disease), cervical    3. Cervical radiculopathy    4. Arthropathy of cervical facet joint        Plan:       Shamar was seen today for pain.    Diagnoses and all orders for this visit:    Pain  -     X-Ray Cervical Spine AP And Lateral    DDD (degenerative disc disease), cervical    Cervical radiculopathy  -     MRI Cervical Spine Without Contrast; Future    Arthropathy of cervical facet joint    Other orders  -     meloxicam (MOBIC) 15 MG tablet; Take 1 tablet (15 mg total) by mouth once daily.         No follow-ups on file.  This is to attest that the assistant, Jose Ramon August served in the capacity as a scribe for this patient's encounter.  This is also verify that I have reviewed the patient's history and  formulated the treatment plan for this patient.  I have evaluated this patient and formulated a treatment plan for this patient visit.  The treatment plan and medical decision-making is outlined below.  Patient has cervical spondylosis, degenerative disc disease and cervical radiculopathy.  I will start him on Mobic 15 mg by mouth once a day with food.  I will obtain an updated MRI of his cervical spine.  Last MRI was done 3 years ago.  I had had a long discussion with him today about his pathophysiology and likely treatment course.  We will see him back with those results with initial plan to refer to pain management for interventional procedures, specifically epidural steroid injection.  Did discuss  with him the possibility of surgical intervention at some point in the future as well.  He verbalized understanding.  Of note, he is under pain management where he receives Percocet 10/325 at a frequency of 90 per month.    Treatment options were discussed with regards to the nature of the medical condition. Conservative pain intervention and surgical options were discussed in detail. The probability of success of each separate treatment option was discussed. The patient expressed a clear understanding of the treatment options. With regards to surgery, the procedure risk, benefits, complications, and outcomes were discussed. No guarantees were given with regards to surgical outcome.   The risk of complications, morbidity, and mortality of patient management decisions have been made at the time of this visit. These are associated with the patient's problems, diagnostic procedures and treatment options. This includes the possible management options selected and those considered but not selected by the patient after shared medical decision making we discussed with the patient.     This note was created using Dragon voice recognition software that occasionally misinterpreted phrases or words.           [1]   Current Outpatient Medications   Medication Sig Dispense Refill    acetaminophen (TYLENOL) 500 MG tablet Take 500 mg by mouth 2 (two) times daily as needed for Pain.      amlodipine (NORVASC) 10 MG tablet Take 1 tablet (10 mg total) by mouth once daily. 90 tablet 3    cetirizine (ZYRTEC) 10 MG tablet Take 10 mg by mouth daily as needed.      cholecalciferol, vitamin D3, (VITAMIN D3) 50 mcg (2,000 unit) Tab Take 2,000 Units by mouth once daily.      fluticasone propionate (FLONASE) 50 mcg/actuation nasal spray SHAKE LIQUID AND USE 1 SPRAY IN EACH NOSTRIL DAILY 16 mL 0    folic acid (FOLVITE) 1 MG tablet Take 1 mg by mouth.      gabapentin (NEURONTIN) 300 MG capsule Take 300 mg by mouth.      levocetirizine (XYZAL) 5  MG tablet Take 1 tablet (5 mg total) by mouth every evening. 30 tablet 11    LIDOcaine (LIDODERM) 5 % Place 5 patches onto the skin Daily.      omeprazole (PRILOSEC) 20 MG capsule Take 20 mg by mouth once daily.      oxyCODONE-acetaminophen (PERCOCET)  mg per tablet Take 1 tablet by mouth every 8 (eight) hours as needed. 90 tablet 0    pantoprazole (PROTONIX) 40 MG tablet TAKE 1 TABLET BY MOUTH EVERY DAY 90 tablet 3    potassium chloride (KLOR-CON) 10 MEQ TbSR Take 10 mEq by mouth.      sildenafiL (VIAGRA) 100 MG tablet 1 tablet as needed Orally Once a day 30 days 30 tablet 1    tadalafiL (CIALIS) 10 MG tablet take 1 tablet by mouth as needed once a day 30 tablet 0    terbinafine HCL (LAMISIL) 250 mg tablet take 1 tablet (250 mg) by oral route once daily 90 tablet 0    meloxicam (MOBIC) 15 MG tablet Take 1 tablet (15 mg total) by mouth once daily. 30 tablet 3    omeprazole-sodium bicarbonate (ZEGERID) 40-1.1 mg-gram per capsule take 1 capsule (40 mg) by oral route once daily (Patient not taking: Reported on 7/30/2025) 90 capsule 1    omeprazole-sodium bicarbonate (ZEGERID) 40-1.1 mg-gram per capsule take 1 capsule (40 mg) by oral route once daily (Patient not taking: Reported on 7/30/2025) 90 capsule 1    oxyCODONE-acetaminophen (PERCOCET)  mg per tablet Take 1 tablet by mouth every 6 (six) hours as needed. (Patient not taking: Reported on 7/30/2025)      oxyCODONE-acetaminophen (PERCOCET)  mg per tablet take 1 tablet by mouth every six hours as needed (Patient not taking: Reported on 3/23/2022) 90 tablet 0    oxyCODONE-acetaminophen (PERCOCET)  mg per tablet Take 1 tablet by mouth every 6 (six) hours as needed for 30 days. (Patient not taking: Reported on 7/30/2025) 90 tablet 0    oxyCODONE-acetaminophen (PERCOCET)  mg per tablet Take 1 tablet by mouth every 6 (six) hours as needed FOR 30 DAYS. (Patient not taking: Reported on 7/30/2025) 90 tablet 0    oxyCODONE-acetaminophen  (PERCOCET)  mg per tablet 1 tablet Orally every 8 hrs As needed 30 days (Patient not taking: Reported on 7/30/2025) 90 tablet 0    oxyCODONE-acetaminophen (PERCOCET)  mg per tablet Take 1 tablet by mouth every 8 (eight) hours as needed. (Patient not taking: Reported on 7/30/2025) 90 tablet 0    oxyCODONE-acetaminophen (PERCOCET)  mg per tablet Take 1 tablet every 8 hours as needed (Patient not taking: Reported on 7/30/2025) 90 tablet 0    oxyCODONE-acetaminophen (PERCOCET)  mg per tablet TAKE 1 TABLET EVERY 8 HOURS AS NEEDED FOR 30 DAYS (Patient not taking: Reported on 7/30/2025) 90 tablet 0    oxyCODONE-acetaminophen (PERCOCET)  mg per tablet TAKE 1 TABLET EVERY 8 HOURS AS NEEDED FOR 30 DAYS (Patient not taking: Reported on 7/30/2025) 90 tablet 0    pantoprazole (PROTONIX) 40 MG tablet take 1 tablet by mouth once a day (Patient not taking: Reported on 7/30/2025) 90 tablet 1    pantoprazole (PROTONIX) 40 MG tablet take 1 tablet by mouth once daily (Patient not taking: Reported on 7/30/2025) 90 tablet 1    pantoprazole (PROTONIX) 40 MG tablet TAKE 1 TABLET BY MOUTH EVERY DAY (Patient not taking: Reported on 7/30/2025) 90 tablet 3    penicillin v potassium (VEETID) 500 MG tablet take 1 tablet Orally four times a day 10 days (Patient not taking: Reported on 7/30/2025) 40 tablet 0    rosuvastatin (CRESTOR) 10 MG tablet Take 1 tablet (10 mg total) by mouth once daily. 90 tablet 3    sildenafiL (VIAGRA) 100 MG tablet TAKE ONE TABLET BY MOUTH EVERY WEEK AS NEEDED 30 TO 60 MINUTES BEFORE INTERCOURSE FOR BEST RESULTS TAKE ON EMPTY STOMACH (Patient not taking: Reported on 7/30/2025)      sildenafiL (VIAGRA) 100 MG tablet Take 1 tablet (100 mg total) by mouth once daily. (Patient not taking: Reported on 7/30/2025) 30 tablet 1    sildenafiL (VIAGRA) 100 MG tablet take 1 tablet as needed Orally Once a day 30 days (Patient not taking: Reported on 7/30/2025) 30 tablet 1    sildenafiL (VIAGRA) 100 MG  tablet as directed (Patient not taking: Reported on 7/30/2025) 30 tablet 1    sildenafiL (VIAGRA) 100 MG tablet Take 1 Tablet, Orally Once a day (Patient not taking: Reported on 7/30/2025) 30 tablet 11    sildenafiL (VIAGRA) 100 MG tablet Take 1 Tablet, Orally Once a day (Patient not taking: Reported on 7/30/2025) 30 tablet 11    sildenafiL (VIAGRA) 100 MG tablet Take 1 tablet (100 mg total) by mouth. (Patient not taking: Reported on 7/30/2025) 30 tablet 11    sildenafiL (VIAGRA) 100 MG tablet Take 1 Tablet, Orally Once a day (Patient not taking: Reported on 7/30/2025) 30 tablet 11    tadalafiL (CIALIS) 10 MG tablet take 1 tablet by mouth once every three days as needed (Patient not taking: Reported on 7/30/2025) 30 tablet 0    tadalafiL (CIALIS) 10 MG tablet take 1 tablet by mouth once every 3 days as needed (Patient not taking: Reported on 7/30/2025) 30 tablet 0    tadalafiL (CIALIS) 10 MG tablet take 1 tablet as needed by mouth once every 3 days (Patient not taking: Reported on 7/30/2025) 30 tablet 0    tadalafiL (CIALIS) 10 MG tablet TAKE 1 TABLET BY MOUTH ONCE EVERY 3 DAYS AS NEEDED (Patient not taking: Reported on 7/30/2025) 30 tablet 0    tadalafiL (CIALIS) 10 MG tablet 1 tablet Orally Once a day As needed 30 days (Patient not taking: Reported on 7/30/2025) 30 tablet 3    tadalafiL (CIALIS) 10 MG tablet 1 tablet Orally Once a day As needed 90 days (Patient not taking: Reported on 7/30/2025) 90 tablet 3    tadalafiL (CIALIS) 10 MG tablet take 1 tablet Orally Once a day As needed 90 days (Patient not taking: Reported on 7/30/2025) 90 tablet 3    terbinafine HCL (LAMISIL) 250 mg tablet take 1 tablet (250 mg) by oral route once daily (Patient not taking: Reported on 7/30/2025) 90 tablet 0     No current facility-administered medications for this visit.   [2]   Social History  Socioeconomic History    Marital status: Legally    Occupational History    Occupation: contractor   Tobacco Use    Smoking  status: Former     Current packs/day: 0.00     Types: Cigarettes     Quit date: 1999     Years since quittin.9    Smokeless tobacco: Never   Substance and Sexual Activity    Alcohol use: Yes     Alcohol/week: 2.0 standard drinks of alcohol     Types: 2 Cans of beer per week     Comment: occasional    Drug use: No    Sexual activity: Yes     Partners: Female     Birth control/protection: Condom

## 2025-08-07 ENCOUNTER — HOSPITAL ENCOUNTER (OUTPATIENT)
Dept: RADIOLOGY | Facility: HOSPITAL | Age: 65
Discharge: HOME | End: 2025-08-07
Attending: ORTHOPAEDIC SURGERY
Payer: MEDICARE

## 2025-08-07 DIAGNOSIS — M54.12 CERVICAL RADICULOPATHY: ICD-10-CM

## 2025-08-07 PROCEDURE — 72141 MRI NECK SPINE W/O DYE: CPT | Mod: 26,,, | Performed by: RADIOLOGY

## 2025-08-07 PROCEDURE — 72141 MRI NECK SPINE W/O DYE: CPT | Mod: TC,PO

## 2025-08-13 ENCOUNTER — OFFICE VISIT (OUTPATIENT)
Dept: ORTHOPEDICS | Facility: CLINIC | Age: 65
End: 2025-08-13
Payer: MEDICARE

## 2025-08-13 VITALS — BODY MASS INDEX: 28.98 KG/M2 | HEIGHT: 71 IN | WEIGHT: 207 LBS

## 2025-08-13 DIAGNOSIS — M47.812 FACET ARTHRITIS OF CERVICAL REGION: Primary | ICD-10-CM

## 2025-08-13 DIAGNOSIS — M48.02 CERVICAL SPINAL STENOSIS: ICD-10-CM

## 2025-08-13 DIAGNOSIS — M50.30 DDD (DEGENERATIVE DISC DISEASE), CERVICAL: ICD-10-CM

## 2025-08-13 PROCEDURE — 3008F BODY MASS INDEX DOCD: CPT | Mod: CPTII,S$GLB,, | Performed by: ORTHOPAEDIC SURGERY

## 2025-08-13 PROCEDURE — 1160F RVW MEDS BY RX/DR IN RCRD: CPT | Mod: CPTII,S$GLB,, | Performed by: ORTHOPAEDIC SURGERY

## 2025-08-13 PROCEDURE — 1159F MED LIST DOCD IN RCRD: CPT | Mod: CPTII,S$GLB,, | Performed by: ORTHOPAEDIC SURGERY

## 2025-08-13 PROCEDURE — 99999 PR PBB SHADOW E&M-EST. PATIENT-LVL IV: CPT | Mod: PBBFAC,,, | Performed by: ORTHOPAEDIC SURGERY

## 2025-08-13 PROCEDURE — 99213 OFFICE O/P EST LOW 20 MIN: CPT | Mod: S$GLB,,, | Performed by: ORTHOPAEDIC SURGERY

## 2025-08-19 ENCOUNTER — OFFICE VISIT (OUTPATIENT)
Dept: PAIN MEDICINE | Facility: CLINIC | Age: 65
End: 2025-08-19
Payer: MEDICARE

## 2025-08-19 ENCOUNTER — TELEPHONE (OUTPATIENT)
Dept: PAIN MEDICINE | Facility: CLINIC | Age: 65
End: 2025-08-19

## 2025-08-19 VITALS — WEIGHT: 207 LBS | BODY MASS INDEX: 28.98 KG/M2 | HEIGHT: 71 IN

## 2025-08-19 DIAGNOSIS — M50.30 DDD (DEGENERATIVE DISC DISEASE), CERVICAL: ICD-10-CM

## 2025-08-19 DIAGNOSIS — M47.812 CERVICAL SPONDYLOSIS WITHOUT MYELOPATHY: Primary | ICD-10-CM

## 2025-08-19 DIAGNOSIS — M48.02 CERVICAL SPINAL STENOSIS: ICD-10-CM

## 2025-08-19 PROCEDURE — 1159F MED LIST DOCD IN RCRD: CPT | Mod: CPTII,S$GLB,, | Performed by: STUDENT IN AN ORGANIZED HEALTH CARE EDUCATION/TRAINING PROGRAM

## 2025-08-19 PROCEDURE — 3008F BODY MASS INDEX DOCD: CPT | Mod: CPTII,S$GLB,, | Performed by: STUDENT IN AN ORGANIZED HEALTH CARE EDUCATION/TRAINING PROGRAM

## 2025-08-19 PROCEDURE — 99204 OFFICE O/P NEW MOD 45 MIN: CPT | Mod: S$GLB,,, | Performed by: STUDENT IN AN ORGANIZED HEALTH CARE EDUCATION/TRAINING PROGRAM

## 2025-08-19 PROCEDURE — 99999 PR PBB SHADOW E&M-EST. PATIENT-LVL IV: CPT | Mod: PBBFAC,,, | Performed by: STUDENT IN AN ORGANIZED HEALTH CARE EDUCATION/TRAINING PROGRAM
